# Patient Record
Sex: FEMALE | Race: WHITE | URBAN - METROPOLITAN AREA
[De-identification: names, ages, dates, MRNs, and addresses within clinical notes are randomized per-mention and may not be internally consistent; named-entity substitution may affect disease eponyms.]

---

## 2021-07-26 ENCOUNTER — APPOINTMENT (OUTPATIENT)
Dept: OTHER | Facility: HOSPITAL | Age: 36
End: 2021-07-26
Attending: EMERGENCY MEDICINE

## 2022-01-05 ENCOUNTER — OFFICE VISIT (OUTPATIENT)
Dept: FAMILY MEDICINE CLINIC | Facility: CLINIC | Age: 37
End: 2022-01-05
Payer: COMMERCIAL

## 2022-01-05 VITALS
OXYGEN SATURATION: 98 % | HEART RATE: 91 BPM | TEMPERATURE: 98 F | SYSTOLIC BLOOD PRESSURE: 108 MMHG | DIASTOLIC BLOOD PRESSURE: 70 MMHG | HEIGHT: 60 IN | WEIGHT: 142 LBS | BODY MASS INDEX: 27.88 KG/M2

## 2022-01-05 DIAGNOSIS — Z11.59 NEED FOR HEPATITIS C SCREENING TEST: ICD-10-CM

## 2022-01-05 DIAGNOSIS — G89.29 CHRONIC BILATERAL THORACIC BACK PAIN: ICD-10-CM

## 2022-01-05 DIAGNOSIS — Z00.00 PREVENTATIVE HEALTH CARE: Primary | ICD-10-CM

## 2022-01-05 DIAGNOSIS — M54.6 CHRONIC BILATERAL THORACIC BACK PAIN: ICD-10-CM

## 2022-01-05 PROCEDURE — 99204 OFFICE O/P NEW MOD 45 MIN: CPT | Performed by: INTERNAL MEDICINE

## 2022-01-05 PROCEDURE — 3078F DIAST BP <80 MM HG: CPT | Performed by: INTERNAL MEDICINE

## 2022-01-05 PROCEDURE — 3008F BODY MASS INDEX DOCD: CPT | Performed by: INTERNAL MEDICINE

## 2022-01-05 PROCEDURE — 3074F SYST BP LT 130 MM HG: CPT | Performed by: INTERNAL MEDICINE

## 2022-01-05 RX ORDER — NAPROXEN 500 MG/1
500 TABLET ORAL 2 TIMES DAILY WITH MEALS
Qty: 60 TABLET | Refills: 0 | Status: SHIPPED | OUTPATIENT
Start: 2022-01-05

## 2022-01-05 RX ORDER — CYCLOBENZAPRINE HCL 10 MG
10 TABLET ORAL 3 TIMES DAILY
Qty: 30 TABLET | Refills: 1 | Status: SHIPPED | OUTPATIENT
Start: 2022-01-05 | End: 2022-01-25

## 2022-01-08 NOTE — PROGRESS NOTES
[facility]  New Patient History and Physical      HPI:   Patient presents with:  Establish Care  Back Pain: upper back  Fatigue      Shade Ami is a 43year old female presenting for:  Establishment of care.   Has  has n pain and sore throat. Eyes: Negative for pain, redness and visual disturbance. Respiratory: Negative for apnea, cough, chest tightness, shortness of breath and wheezing. Cardiovascular: Negative for chest pain, palpitations and leg swelling.    Michele (M54.6,  G89.29) Chronic bilateral thoracic back pain  Plan: Likely muscle strain. Discussed supportive treatment with NSAIDs. Muscle relaxer prescribed.                   Health Maintenance:  Annual Physical Never done  COVID-19 Vaccine(1) Never done

## 2022-02-19 ENCOUNTER — LAB ENCOUNTER (OUTPATIENT)
Dept: LAB | Facility: REFERENCE LAB | Age: 37
End: 2022-02-19
Attending: INTERNAL MEDICINE
Payer: COMMERCIAL

## 2022-02-19 DIAGNOSIS — Z00.00 ROUTINE GENERAL MEDICAL EXAMINATION AT A HEALTH CARE FACILITY: Primary | ICD-10-CM

## 2022-02-19 LAB
ALBUMIN SERPL-MCNC: 3.9 G/DL (ref 3.4–5)
ALBUMIN/GLOB SERPL: 1.1 {RATIO} (ref 1–2)
ALP LIVER SERPL-CCNC: 69 U/L
ALT SERPL-CCNC: 19 U/L
ANION GAP SERPL CALC-SCNC: 4 MMOL/L (ref 0–18)
AST SERPL-CCNC: 12 U/L (ref 15–37)
BILIRUB SERPL-MCNC: 0.3 MG/DL (ref 0.1–2)
BUN BLD-MCNC: 12 MG/DL (ref 7–18)
BUN/CREAT SERPL: 24 (ref 10–20)
CALCIUM BLD-MCNC: 9.5 MG/DL (ref 8.5–10.1)
CHLORIDE SERPL-SCNC: 108 MMOL/L (ref 98–112)
CHOLEST SERPL-MCNC: 204 MG/DL (ref ?–200)
CO2 SERPL-SCNC: 25 MMOL/L (ref 21–32)
CREAT BLD-MCNC: 0.5 MG/DL
DEPRECATED RDW RBC AUTO: 44.1 FL (ref 35.1–46.3)
ERYTHROCYTE [DISTWIDTH] IN BLOOD BY AUTOMATED COUNT: 12.8 % (ref 11–15)
FASTING PATIENT LIPID ANSWER: YES
FASTING STATUS PATIENT QL REPORTED: YES
GLOBULIN PLAS-MCNC: 3.6 G/DL (ref 2.8–4.4)
GLUCOSE BLD-MCNC: 98 MG/DL (ref 70–99)
HCT VFR BLD AUTO: 40.5 %
HCV AB SERPL QL IA: NONREACTIVE
HDLC SERPL-MCNC: 52 MG/DL (ref 40–59)
HGB BLD-MCNC: 13.3 G/DL
LDLC SERPL CALC-MCNC: 126 MG/DL (ref ?–100)
MCH RBC QN AUTO: 30.4 PG (ref 26–34)
MCHC RBC AUTO-ENTMCNC: 32.8 G/DL (ref 31–37)
MCV RBC AUTO: 92.7 FL
NONHDLC SERPL-MCNC: 152 MG/DL (ref ?–130)
OSMOLALITY SERPL CALC.SUM OF ELEC: 284 MOSM/KG (ref 275–295)
PLATELET # BLD AUTO: 247 10(3)UL (ref 150–450)
POTASSIUM SERPL-SCNC: 4.2 MMOL/L (ref 3.5–5.1)
PROT SERPL-MCNC: 7.5 G/DL (ref 6.4–8.2)
SODIUM SERPL-SCNC: 137 MMOL/L (ref 136–145)
TRIGL SERPL-MCNC: 147 MG/DL (ref 30–149)
VLDLC SERPL CALC-MCNC: 26 MG/DL (ref 0–30)
WBC # BLD AUTO: 6.9 X10(3) UL (ref 4–11)

## 2022-02-19 PROCEDURE — 86803 HEPATITIS C AB TEST: CPT | Performed by: INTERNAL MEDICINE

## 2022-02-19 PROCEDURE — 80061 LIPID PANEL: CPT

## 2022-02-19 PROCEDURE — 85027 COMPLETE CBC AUTOMATED: CPT | Performed by: INTERNAL MEDICINE

## 2022-02-19 PROCEDURE — 36415 COLL VENOUS BLD VENIPUNCTURE: CPT | Performed by: INTERNAL MEDICINE

## 2022-02-19 PROCEDURE — 80053 COMPREHEN METABOLIC PANEL: CPT | Performed by: INTERNAL MEDICINE

## 2022-03-01 ENCOUNTER — HOSPITAL ENCOUNTER (OUTPATIENT)
Age: 37
Discharge: HOME | End: 2022-03-01
Payer: COMMERCIAL

## 2022-03-01 VITALS
SYSTOLIC BLOOD PRESSURE: 123 MMHG | WEIGHT: 146 LBS | OXYGEN SATURATION: 100 % | TEMPERATURE: 98 F | HEIGHT: 61 IN | RESPIRATION RATE: 16 BRPM | DIASTOLIC BLOOD PRESSURE: 78 MMHG | BODY MASS INDEX: 27.56 KG/M2 | HEART RATE: 76 BPM

## 2022-03-01 DIAGNOSIS — R30.0 DYSURIA: Primary | ICD-10-CM

## 2022-03-01 LAB
B-HCG UR QL: NEGATIVE
BILIRUB UR QL STRIP: NEGATIVE
CLARITY UR: CLEAR
COLOR UR: YELLOW
GLUCOSE UR STRIP-MCNC: NEGATIVE MG/DL
HGB UR QL STRIP: NEGATIVE
LEUKOCYTE ESTERASE UR QL STRIP: NEGATIVE
NITRITE UR QL STRIP: NEGATIVE
PH UR STRIP: 5.5 [PH]
PROT UR STRIP-MCNC: NEGATIVE MG/DL
SP GR UR STRIP: 1.01
UROBILINOGEN UR STRIP-ACNC: <2 MG/DL

## 2022-03-01 PROCEDURE — 87077 CULTURE AEROBIC IDENTIFY: CPT | Performed by: NURSE PRACTITIONER

## 2022-03-01 PROCEDURE — 87186 SC STD MICRODIL/AGAR DIL: CPT | Performed by: NURSE PRACTITIONER

## 2022-03-01 PROCEDURE — 99204 OFFICE O/P NEW MOD 45 MIN: CPT | Performed by: NURSE PRACTITIONER

## 2022-03-01 PROCEDURE — 81002 URINALYSIS NONAUTO W/O SCOPE: CPT | Performed by: NURSE PRACTITIONER

## 2022-03-01 PROCEDURE — 87086 URINE CULTURE/COLONY COUNT: CPT | Performed by: NURSE PRACTITIONER

## 2022-03-01 PROCEDURE — 81025 URINE PREGNANCY TEST: CPT | Performed by: NURSE PRACTITIONER

## 2022-03-01 RX ORDER — CEPHALEXIN 500 MG/1
500 CAPSULE ORAL 2 TIMES DAILY
Qty: 14 CAPSULE | Refills: 0 | Status: SHIPPED | OUTPATIENT
Start: 2022-03-01 | End: 2022-03-08

## 2022-03-01 NOTE — ED INITIAL ASSESSMENT (HPI)
Pt here w c/o pelvic pain and dysuria, frequency x Friday. No back pain, no N/V. No fever. States bought some OTC med to help w symptoms but did not work.

## 2022-03-08 ENCOUNTER — OFFICE VISIT (OUTPATIENT)
Dept: FAMILY MEDICINE CLINIC | Facility: CLINIC | Age: 37
End: 2022-03-08
Payer: COMMERCIAL

## 2022-03-08 VITALS
DIASTOLIC BLOOD PRESSURE: 80 MMHG | WEIGHT: 145.81 LBS | HEART RATE: 88 BPM | SYSTOLIC BLOOD PRESSURE: 114 MMHG | HEIGHT: 61 IN | BODY MASS INDEX: 27.53 KG/M2 | OXYGEN SATURATION: 100 %

## 2022-03-08 DIAGNOSIS — E78.5 HYPERLIPIDEMIA, UNSPECIFIED HYPERLIPIDEMIA TYPE: Primary | ICD-10-CM

## 2022-03-08 DIAGNOSIS — H66.90 ACUTE OTITIS MEDIA, UNSPECIFIED OTITIS MEDIA TYPE: ICD-10-CM

## 2022-03-08 PROCEDURE — 3079F DIAST BP 80-89 MM HG: CPT | Performed by: INTERNAL MEDICINE

## 2022-03-08 PROCEDURE — 99214 OFFICE O/P EST MOD 30 MIN: CPT | Performed by: INTERNAL MEDICINE

## 2022-03-08 PROCEDURE — 3008F BODY MASS INDEX DOCD: CPT | Performed by: INTERNAL MEDICINE

## 2022-03-08 PROCEDURE — 3074F SYST BP LT 130 MM HG: CPT | Performed by: INTERNAL MEDICINE

## 2022-03-08 RX ORDER — AMOXICILLIN AND CLAVULANATE POTASSIUM 875; 125 MG/1; MG/1
1 TABLET, FILM COATED ORAL 2 TIMES DAILY
Qty: 20 TABLET | Refills: 0 | Status: SHIPPED | OUTPATIENT
Start: 2022-03-08 | End: 2022-03-18

## 2022-05-16 ENCOUNTER — OFFICE VISIT (OUTPATIENT)
Dept: INTERNAL MEDICINE CLINIC | Facility: CLINIC | Age: 37
End: 2022-05-16
Payer: COMMERCIAL

## 2022-05-16 VITALS
DIASTOLIC BLOOD PRESSURE: 60 MMHG | SYSTOLIC BLOOD PRESSURE: 96 MMHG | BODY MASS INDEX: 27.75 KG/M2 | HEIGHT: 61 IN | TEMPERATURE: 98 F | WEIGHT: 147 LBS | HEART RATE: 78 BPM | OXYGEN SATURATION: 99 %

## 2022-05-16 DIAGNOSIS — N89.8 VAGINAL DISCHARGE: ICD-10-CM

## 2022-05-16 DIAGNOSIS — Z11.3 SCREENING FOR STD (SEXUALLY TRANSMITTED DISEASE): ICD-10-CM

## 2022-05-16 DIAGNOSIS — Z12.4 PAP SMEAR FOR CERVICAL CANCER SCREENING: Primary | ICD-10-CM

## 2022-05-16 RX ORDER — FAMOTIDINE 20 MG/1
20 TABLET, FILM COATED ORAL 2 TIMES DAILY
Qty: 30 TABLET | Refills: 0 | Status: SHIPPED | OUTPATIENT
Start: 2022-05-16

## 2022-05-16 RX ORDER — FLUCONAZOLE 150 MG/1
150 TABLET ORAL ONCE
Qty: 1 TABLET | Refills: 0 | Status: SHIPPED | OUTPATIENT
Start: 2022-05-16 | End: 2022-05-16

## 2022-05-17 ENCOUNTER — TELEPHONE (OUTPATIENT)
Dept: INTERNAL MEDICINE CLINIC | Facility: CLINIC | Age: 37
End: 2022-05-17

## 2022-05-18 LAB
CHLAMYDIA TRACHOMATIS$RNA, TMA: NOT DETECTED
HPV MRNA E6/E7: NOT DETECTED
NEISSERIA GONORRHOEAE$RNA, TMA: NOT DETECTED

## 2022-05-24 ENCOUNTER — TELEPHONE (OUTPATIENT)
Dept: INTERNAL MEDICINE CLINIC | Facility: CLINIC | Age: 37
End: 2022-05-24

## 2022-05-24 NOTE — TELEPHONE ENCOUNTER
Patient called back and results were discussed, she is doing much better and symptoms cleared up already. She is aware she should get pap done in 5 yrs again.

## 2022-05-24 NOTE — TELEPHONE ENCOUNTER
LMTCB      ----- Message from Coy Sanchez MD sent at 5/19/2022  3:18 PM CDT -----  Please inform her that her PAP smear is normal with negative HPV. Repeat recommended in 3 years. Please inquire with [facility] as to what occurred with vaginosis screen. They ordered a aerobic culture instead, I never placed that order.

## 2022-09-08 ENCOUNTER — OFFICE VISIT (OUTPATIENT)
Dept: INTERNAL MEDICINE CLINIC | Facility: CLINIC | Age: 37
End: 2022-09-08

## 2022-09-08 VITALS
BODY MASS INDEX: 28.89 KG/M2 | HEART RATE: 80 BPM | OXYGEN SATURATION: 100 % | SYSTOLIC BLOOD PRESSURE: 116 MMHG | HEIGHT: 61 IN | TEMPERATURE: 99 F | WEIGHT: 153 LBS | DIASTOLIC BLOOD PRESSURE: 70 MMHG

## 2022-09-08 DIAGNOSIS — R53.83 OTHER FATIGUE: ICD-10-CM

## 2022-09-08 DIAGNOSIS — E78.5 HYPERLIPIDEMIA, UNSPECIFIED HYPERLIPIDEMIA TYPE: ICD-10-CM

## 2022-09-08 DIAGNOSIS — Z13.1 DIABETES MELLITUS SCREENING: ICD-10-CM

## 2022-09-08 DIAGNOSIS — Z00.00 ANNUAL PHYSICAL EXAM: Primary | ICD-10-CM

## 2022-09-08 PROCEDURE — 99395 PREV VISIT EST AGE 18-39: CPT | Performed by: INTERNAL MEDICINE

## 2022-09-08 PROCEDURE — 3074F SYST BP LT 130 MM HG: CPT | Performed by: INTERNAL MEDICINE

## 2022-09-08 PROCEDURE — 3008F BODY MASS INDEX DOCD: CPT | Performed by: INTERNAL MEDICINE

## 2022-09-08 PROCEDURE — 3078F DIAST BP <80 MM HG: CPT | Performed by: INTERNAL MEDICINE

## 2022-09-08 RX ORDER — CYCLOBENZAPRINE HCL 10 MG
10 TABLET ORAL NIGHTLY PRN
Qty: 30 TABLET | Refills: 1 | Status: SHIPPED | OUTPATIENT
Start: 2022-09-08 | End: 2022-09-28

## 2022-09-08 RX ORDER — NAPROXEN 500 MG/1
500 TABLET ORAL 2 TIMES DAILY WITH MEALS
Qty: 60 TABLET | Refills: 0 | Status: SHIPPED | OUTPATIENT
Start: 2022-09-08

## 2022-09-09 LAB
ABSOLUTE BASOPHILS: 31 CELLS/UL (ref 0–200)
ABSOLUTE EOSINOPHILS: 146 CELLS/UL (ref 15–500)
ABSOLUTE LYMPHOCYTES: 3480 CELLS/UL (ref 850–3900)
ABSOLUTE MONOCYTES: 524 CELLS/UL (ref 200–950)
ABSOLUTE NEUTROPHILS: 3519 CELLS/UL (ref 1500–7800)
ALBUMIN/GLOBULIN RATIO: 1.5 (CALC) (ref 1–2.5)
ALBUMIN: 4.5 G/DL (ref 3.6–5.1)
ALKALINE PHOSPHATASE: 61 U/L (ref 31–125)
ALT: 18 U/L (ref 6–29)
AST: 21 U/L (ref 10–30)
BASOPHILS: 0.4 %
BILIRUBIN, TOTAL: 0.3 MG/DL (ref 0.2–1.2)
BUN: 12 MG/DL (ref 7–25)
CALCIUM: 10.1 MG/DL (ref 8.6–10.2)
CARBON DIOXIDE: 24 MMOL/L (ref 20–32)
CHLORIDE: 103 MMOL/L (ref 98–110)
CHOL/HDLC RATIO: 3.6 (CALC)
CHOLESTEROL, TOTAL: 222 MG/DL
CREATININE: 0.57 MG/DL (ref 0.5–0.97)
EGFR: 119 ML/MIN/1.73M2
EOSINOPHILS: 1.9 %
GLOBULIN: 3.1 G/DL (CALC) (ref 1.9–3.7)
GLUCOSE: 90 MG/DL (ref 65–99)
HDL CHOLESTEROL: 61 MG/DL
HEMATOCRIT: 35.7 % (ref 35–45)
HEMOGLOBIN A1C: 5.4 % OF TOTAL HGB
HEMOGLOBIN: 11.9 G/DL (ref 11.7–15.5)
LDL-CHOLESTEROL: 133 MG/DL (CALC)
LYMPHOCYTES: 45.2 %
MCH: 30.2 PG (ref 27–33)
MCHC: 33.3 G/DL (ref 32–36)
MCV: 90.6 FL (ref 80–100)
MONOCYTES: 6.8 %
MPV: 11.1 FL (ref 7.5–12.5)
NEUTROPHILS: 45.7 %
NON-HDL CHOLESTEROL: 161 MG/DL (CALC)
PLATELET COUNT: 218 THOUSAND/UL (ref 140–400)
POTASSIUM: 4.1 MMOL/L (ref 3.5–5.3)
PROTEIN, TOTAL: 7.6 G/DL (ref 6.1–8.1)
RDW: 13.5 % (ref 11–15)
RED BLOOD CELL COUNT: 3.94 MILLION/UL (ref 3.8–5.1)
SODIUM: 137 MMOL/L (ref 135–146)
TRIGLYCERIDES: 150 MG/DL
TSH W/REFLEX TO FT4: 1.56 MIU/L
WHITE BLOOD CELL COUNT: 7.7 THOUSAND/UL (ref 3.8–10.8)

## 2022-09-20 ENCOUNTER — TELEPHONE (OUTPATIENT)
Dept: INTERNAL MEDICINE CLINIC | Facility: CLINIC | Age: 37
End: 2022-09-20

## 2022-09-20 NOTE — TELEPHONE ENCOUNTER
Results were discussed with patient and was advised to follow Md's recommendations on how to lower her cholesterol she will try to follow his advise.

## 2022-09-20 NOTE — TELEPHONE ENCOUNTER
Results reviewed. Please inform patient that lipid panel was abnormal, primarily triglycerides and LDL. LDL (low-density lipoprotein) sometimes called \"bad\" cholesterol makes up the majority of your body's cholesterol. High levels of LDL raise your risk of cardiovascular disease including heart disease and stroke. Diets high in saturated fats, salts and cholesterol such as fatty meats, processed foods, dairy and cured meats can lead to elevated LDL levels. Triglycerides are a type of fat (lipid) found in your blood. Recommendations to lower triglycerides include avoiding sugars, refined carbohydrates and alcohol. Increasing foods higher in omega-3 such as fish, dark leafy green vegetables. Increase consumption of vegetables, fruits, herbs, nuts, beans and whole grains. Decrease intake of saturated fats, processed foods, meats and sweets. Moderate amounts of dairy, poultry and seafood. No other abnormalities.

## 2022-12-07 ENCOUNTER — OFFICE VISIT (OUTPATIENT)
Dept: INTERNAL MEDICINE CLINIC | Facility: CLINIC | Age: 37
End: 2022-12-07
Payer: COMMERCIAL

## 2022-12-07 VITALS
OXYGEN SATURATION: 100 % | BODY MASS INDEX: 27 KG/M2 | WEIGHT: 145 LBS | TEMPERATURE: 98 F | DIASTOLIC BLOOD PRESSURE: 60 MMHG | HEART RATE: 72 BPM | SYSTOLIC BLOOD PRESSURE: 116 MMHG

## 2022-12-07 DIAGNOSIS — Z32.00 POSSIBLE PREGNANCY, NOT CONFIRMED: ICD-10-CM

## 2022-12-07 DIAGNOSIS — Z30.09 ENCOUNTER FOR COUNSELING REGARDING CONTRACEPTION: ICD-10-CM

## 2022-12-07 DIAGNOSIS — E78.5 HYPERLIPIDEMIA, UNSPECIFIED HYPERLIPIDEMIA TYPE: ICD-10-CM

## 2022-12-07 DIAGNOSIS — R11.2 NAUSEA AND VOMITING, UNSPECIFIED VOMITING TYPE: Primary | ICD-10-CM

## 2022-12-07 LAB
CONTROL LINE PRESENT WITH A CLEAR BACKGROUND (YES/NO): YES YES/NO
PREGNANCY TEST, URINE: NEGATIVE

## 2022-12-07 PROCEDURE — 3074F SYST BP LT 130 MM HG: CPT | Performed by: INTERNAL MEDICINE

## 2022-12-07 PROCEDURE — 99214 OFFICE O/P EST MOD 30 MIN: CPT | Performed by: INTERNAL MEDICINE

## 2022-12-07 PROCEDURE — 90686 IIV4 VACC NO PRSV 0.5 ML IM: CPT | Performed by: INTERNAL MEDICINE

## 2022-12-07 PROCEDURE — 3078F DIAST BP <80 MM HG: CPT | Performed by: INTERNAL MEDICINE

## 2022-12-07 PROCEDURE — 90471 IMMUNIZATION ADMIN: CPT | Performed by: INTERNAL MEDICINE

## 2022-12-07 PROCEDURE — 81025 URINE PREGNANCY TEST: CPT | Performed by: INTERNAL MEDICINE

## 2022-12-07 RX ORDER — FAMOTIDINE 20 MG/1
20 TABLET, FILM COATED ORAL DAILY
Qty: 30 TABLET | Refills: 0 | Status: SHIPPED | OUTPATIENT
Start: 2022-12-07

## 2022-12-07 RX ORDER — ONDANSETRON 4 MG/1
4 TABLET, FILM COATED ORAL DAILY PRN
Qty: 10 TABLET | Refills: 0 | Status: SHIPPED | OUTPATIENT
Start: 2022-12-07

## 2023-03-08 ENCOUNTER — OFFICE VISIT (OUTPATIENT)
Dept: INTERNAL MEDICINE CLINIC | Facility: CLINIC | Age: 38
End: 2023-03-08
Payer: COMMERCIAL

## 2023-03-08 VITALS
OXYGEN SATURATION: 99 % | TEMPERATURE: 99 F | HEART RATE: 79 BPM | WEIGHT: 152 LBS | DIASTOLIC BLOOD PRESSURE: 64 MMHG | BODY MASS INDEX: 29 KG/M2 | SYSTOLIC BLOOD PRESSURE: 96 MMHG

## 2023-03-08 DIAGNOSIS — E78.5 HYPERLIPIDEMIA, UNSPECIFIED HYPERLIPIDEMIA TYPE: ICD-10-CM

## 2023-03-08 DIAGNOSIS — M54.6 ACUTE BILATERAL THORACIC BACK PAIN: Primary | ICD-10-CM

## 2023-03-08 RX ORDER — NAPROXEN 500 MG/1
500 TABLET ORAL 2 TIMES DAILY PRN
Qty: 60 TABLET | Refills: 1 | Status: SHIPPED | OUTPATIENT
Start: 2023-03-08

## 2023-03-08 RX ORDER — CYCLOBENZAPRINE HCL 10 MG
10 TABLET ORAL NIGHTLY PRN
Qty: 90 TABLET | Refills: 1 | Status: SHIPPED | OUTPATIENT
Start: 2023-03-08

## 2023-03-08 RX ORDER — CYCLOBENZAPRINE HCL 10 MG
10 TABLET ORAL NIGHTLY PRN
Qty: 30 TABLET | Refills: 1 | Status: SHIPPED | OUTPATIENT
Start: 2023-03-08 | End: 2023-03-08

## 2023-08-19 LAB
CHOL/HDLC RATIO: 4.3 (CALC)
CHOLESTEROL, TOTAL: 213 MG/DL
HDL CHOLESTEROL: 50 MG/DL
LDL-CHOLESTEROL: 130 MG/DL (CALC)
NON-HDL CHOLESTEROL: 163 MG/DL (CALC)
TRIGLYCERIDES: 193 MG/DL

## 2023-09-05 ENCOUNTER — VIRTUAL PHONE E/M (OUTPATIENT)
Dept: INTERNAL MEDICINE CLINIC | Facility: CLINIC | Age: 38
End: 2023-09-05
Payer: COMMERCIAL

## 2023-09-05 DIAGNOSIS — U07.1 LAB TEST POSITIVE FOR DETECTION OF COVID-19 VIRUS: Primary | ICD-10-CM

## 2023-09-05 DIAGNOSIS — E78.5 HYPERLIPIDEMIA, UNSPECIFIED HYPERLIPIDEMIA TYPE: ICD-10-CM

## 2023-09-05 PROCEDURE — 99213 OFFICE O/P EST LOW 20 MIN: CPT | Performed by: INTERNAL MEDICINE

## 2023-09-05 RX ORDER — NAPROXEN 500 MG/1
500 TABLET ORAL 2 TIMES DAILY PRN
Qty: 60 TABLET | Refills: 1 | Status: SHIPPED | OUTPATIENT
Start: 2023-09-05

## 2023-09-29 ENCOUNTER — TELEPHONE (OUTPATIENT)
Dept: INTERNAL MEDICINE CLINIC | Facility: CLINIC | Age: 38
End: 2023-09-29

## 2023-09-29 DIAGNOSIS — Z13.1 DIABETES MELLITUS SCREENING: ICD-10-CM

## 2023-09-29 DIAGNOSIS — Z00.00 PREVENTATIVE HEALTH CARE: ICD-10-CM

## 2023-09-29 DIAGNOSIS — R53.83 OTHER FATIGUE: ICD-10-CM

## 2023-09-29 DIAGNOSIS — E78.5 HYPERLIPIDEMIA, UNSPECIFIED HYPERLIPIDEMIA TYPE: Primary | ICD-10-CM

## 2023-09-29 NOTE — TELEPHONE ENCOUNTER
Pt is calling stating that she has on appointment on 10/11 and was told needs to get blood work done before appointment. Pt mention she goes to [email or web address] in [place] and would like for orders to be mail to home address. Confirm address with pt.        Please call and advise

## 2023-10-23 ENCOUNTER — NURSE TRIAGE (OUTPATIENT)
Dept: INTERNAL MEDICINE CLINIC | Facility: CLINIC | Age: 38
End: 2023-10-23

## 2023-10-23 NOTE — TELEPHONE ENCOUNTER
Pt is calling stating that has sore throat, headache and both earache for about 3 days. Pt wants to know if doctor could prescribe something or needs to be seen.        Please call and advise

## 2023-10-23 NOTE — TELEPHONE ENCOUNTER
Had Covid 1 month ago. Caller will go to [facility] near her home. Reason for Disposition   SEVERE sore throat pain    Answer Assessment - Initial Assessment Questions  1. ONSET: \"When did the throat start hurting? \" (Hours or days ago)       3 days  2. SEVERITY: \"How bad is the sore throat? \" (Scale 1-10; mild, moderate or severe)    - MILD (1-3):  doesn't interfere with eating or normal activities    - MODERATE (4-7): interferes with eating some solids and normal activities    - SEVERE (8-10):  excruciating pain, interferes with most normal activities    - SEVERE DYSPHAGIA: can't swallow liquids, drooling      8/10  3. STREP EXPOSURE: \"Has there been any exposure to strep within the past week? \" If Yes, ask: \"What type of contact occurred? \"       No  4. VIRAL SYMPTOMS: Omer Lee there any symptoms of a cold, such as a runny nose, cough, hoarse voice or red eyes? \"       Ear ache  5. FEVER: \"Do you have a fever? \" If Yes, ask: \"What is your temperature, how was it measured, and when did it start? \"      No  6. PUS ON THE TONSILS: \"Is there pus on the tonsils in the back of your throat? \"      No  7. OTHER SYMPTOMS: \"Do you have any other symptoms? \" (e.g., difficulty breathing, headache, rash)      Earache  8. PREGNANCY: \"Is there any chance you are pregnant? \" \"When was your last menstrual period? \"      9/26/23    Protocols used: Sore Throat-A-OH

## 2023-10-31 ENCOUNTER — OFFICE VISIT (OUTPATIENT)
Dept: FAMILY MEDICINE CLINIC | Facility: CLINIC | Age: 38
End: 2023-10-31

## 2023-10-31 ENCOUNTER — NURSE TRIAGE (OUTPATIENT)
Dept: INTERNAL MEDICINE CLINIC | Facility: CLINIC | Age: 38
End: 2023-10-31

## 2023-10-31 VITALS
SYSTOLIC BLOOD PRESSURE: 136 MMHG | DIASTOLIC BLOOD PRESSURE: 86 MMHG | OXYGEN SATURATION: 99 % | TEMPERATURE: 98 F | WEIGHT: 156.38 LBS | HEIGHT: 61 IN | HEART RATE: 70 BPM | RESPIRATION RATE: 18 BRPM | BODY MASS INDEX: 29.52 KG/M2

## 2023-10-31 DIAGNOSIS — J02.9 ACUTE PHARYNGITIS, UNSPECIFIED ETIOLOGY: Primary | ICD-10-CM

## 2023-10-31 PROCEDURE — 3079F DIAST BP 80-89 MM HG: CPT | Performed by: PHYSICIAN ASSISTANT

## 2023-10-31 PROCEDURE — 3008F BODY MASS INDEX DOCD: CPT | Performed by: PHYSICIAN ASSISTANT

## 2023-10-31 PROCEDURE — 3075F SYST BP GE 130 - 139MM HG: CPT | Performed by: PHYSICIAN ASSISTANT

## 2023-10-31 PROCEDURE — 99213 OFFICE O/P EST LOW 20 MIN: CPT | Performed by: PHYSICIAN ASSISTANT

## 2023-10-31 RX ORDER — AMOXICILLIN 500 MG/1
500 CAPSULE ORAL 3 TIMES DAILY
Qty: 30 CAPSULE | Refills: 0 | Status: SHIPPED | OUTPATIENT
Start: 2023-10-31 | End: 2023-11-10

## 2023-10-31 NOTE — TELEPHONE ENCOUNTER
Pt was seen at the [facility]. She was has given Amox-clav for 5 days dexamethasone x 2 days. She has been treating herself with Tylenol. Patient will go to [facility]. Reason for Disposition   Patient sounds very sick or weak to the triager    Answer Assessment - Initial Assessment Questions  1. COVID-19 DIAGNOSIS: \"Who made your COVID-19 diagnosis? \" \"Was it confirmed by a positive lab test or self-test?\" If not diagnosed by a doctor (or NP/PA), ask \"Are there lots of cases (community spread) where you live? \" Note: See public health department website, if unsure. No test   2. COVID-19 EXPOSURE: \"Was there any known exposure to COVID before the symptoms began? \" CDC Definition of close contact: within 6 feet (2 meters) for a total of 15 minutes or more over a 24-hour period. 3. ONSET: \"When did the COVID-19 symptoms start? \"       10/20/23  4. WORST SYMPTOM: \"What is your worst symptom? \" (e.g., cough, fever, shortness of breath, muscle aches)      Headache/ sore throat  5. COUGH: \"Do you have a cough? \" If Yes, ask: \"How bad is the cough? \"       No  6. FEVER: \"Do you have a fever? \" If Yes, ask: \"What is your temperature, how was it measured, and when did it start? \"      No   7. RESPIRATORY STATUS: \"Describe your breathing? \" (e.g., shortness of breath, wheezing, unable to speak)       No   8. BETTER-SAME-WORSE: Terrial Crank you getting better, staying the same or getting worse compared to yesterday? \"  If getting worse, ask, \"In what way? \"      Same  9. HIGH RISK DISEASE: \"Do you have any chronic medical problems? \" (e.g., asthma, heart or lung disease, weak immune system, obesity, etc.)      No   10. VACCINE: \"Have you had the COVID-19 vaccine? \" If Yes, ask: \"Which one, how many shots, when did you get it? \"        3 vaccines   11. BOOSTER: \"Have you received your COVID-19 booster? \" If Yes, ask: \"Which one and when did you get it? \"          12. PREGNANCY: \"Is there any chance you are pregnant? \" \"When was your last menstrual period? \"        10/25/23  13. OTHER SYMPTOMS: \"Do you have any other symptoms? \"  (e.g., chills, fatigue, headache, loss of smell or taste, muscle pain, sore throat)        Headache/ hot eyes/ sore throat/ear pain/ tired/ feels hot inside her body / sole of feet are painful. 14. O2 SATURATION MONITOR:  \"Do you use an oxygen saturation monitor (pulse oximeter) at home? \" If Yes, ask \"What is your reading (oxygen level) today? \" \"What is your usual oxygen saturation reading? \" (e.g., 95%)    Protocols used: Coronavirus ([id number]-60) Diagnosed or [id number]

## 2023-10-31 NOTE — TELEPHONE ENCOUNTER
Pt is calling stating that she had call last week, encounter from 10/23. Pt states she went to  and was tested positive for strep throat. Pt mention she was given medication and is almost done with medication but is still not getting better. Pt wants to know if there is something else she can do.       Please call and advise

## 2023-11-07 ENCOUNTER — OFFICE VISIT (OUTPATIENT)
Dept: INTERNAL MEDICINE CLINIC | Facility: CLINIC | Age: 38
End: 2023-11-07
Payer: COMMERCIAL

## 2023-11-07 VITALS
HEART RATE: 79 BPM | OXYGEN SATURATION: 100 % | HEIGHT: 60.25 IN | BODY MASS INDEX: 30.04 KG/M2 | WEIGHT: 155 LBS | DIASTOLIC BLOOD PRESSURE: 60 MMHG | TEMPERATURE: 98 F | SYSTOLIC BLOOD PRESSURE: 112 MMHG

## 2023-11-07 DIAGNOSIS — Z00.00 ANNUAL PHYSICAL EXAM: Primary | ICD-10-CM

## 2023-11-07 DIAGNOSIS — E78.5 HYPERLIPIDEMIA, UNSPECIFIED HYPERLIPIDEMIA TYPE: ICD-10-CM

## 2023-11-07 DIAGNOSIS — Z13.1 DIABETES MELLITUS SCREENING: ICD-10-CM

## 2023-11-07 LAB
ABSOLUTE BASOPHILS: 27 CELLS/UL (ref 0–200)
ABSOLUTE EOSINOPHILS: 168 CELLS/UL (ref 15–500)
ABSOLUTE LYMPHOCYTES: 2439 CELLS/UL (ref 850–3900)
ABSOLUTE MONOCYTES: 482 CELLS/UL (ref 200–950)
ABSOLUTE NEUTROPHILS: 3585 CELLS/UL (ref 1500–7800)
ALBUMIN/GLOBULIN RATIO: 1.4 (CALC) (ref 1–2.5)
ALBUMIN: 4.2 G/DL (ref 3.6–5.1)
ALKALINE PHOSPHATASE: 65 U/L (ref 31–125)
ALT: 14 U/L (ref 6–29)
AST: 16 U/L (ref 10–30)
BASOPHILS: 0.4 %
BILIRUBIN, TOTAL: 0.3 MG/DL (ref 0.2–1.2)
BUN: 16 MG/DL (ref 7–25)
CALCIUM: 9.3 MG/DL (ref 8.6–10.2)
CARBON DIOXIDE: 25 MMOL/L (ref 20–32)
CHLORIDE: 107 MMOL/L (ref 98–110)
CHOL/HDLC RATIO: 3.9 (CALC)
CHOLESTEROL, TOTAL: 232 MG/DL
CREATININE: 0.51 MG/DL (ref 0.5–0.97)
EGFR: 122 ML/MIN/1.73M2
EOSINOPHILS: 2.5 %
GLOBULIN: 3.1 G/DL (CALC) (ref 1.9–3.7)
GLUCOSE: 109 MG/DL (ref 65–99)
HDL CHOLESTEROL: 59 MG/DL
HEMATOCRIT: 40.2 % (ref 35–45)
HEMOGLOBIN A1C: 5.6 % OF TOTAL HGB
HEMOGLOBIN: 13.1 G/DL (ref 11.7–15.5)
LDL-CHOLESTEROL: 151 MG/DL (CALC)
LYMPHOCYTES: 36.4 %
MCH: 30 PG (ref 27–33)
MCHC: 32.6 G/DL (ref 32–36)
MCV: 92.2 FL (ref 80–100)
MONOCYTES: 7.2 %
MPV: 11.3 FL (ref 7.5–12.5)
NEUTROPHILS: 53.5 %
NON-HDL CHOLESTEROL: 173 MG/DL (CALC)
PLATELET COUNT: 243 THOUSAND/UL (ref 140–400)
POTASSIUM: 4.4 MMOL/L (ref 3.5–5.3)
PROTEIN, TOTAL: 7.3 G/DL (ref 6.1–8.1)
RDW: 12.5 % (ref 11–15)
RED BLOOD CELL COUNT: 4.36 MILLION/UL (ref 3.8–5.1)
SODIUM: 138 MMOL/L (ref 135–146)
TRIGLYCERIDES: 107 MG/DL
TSH W/REFLEX TO FT4: 1.15 MIU/L
WHITE BLOOD CELL COUNT: 6.7 THOUSAND/UL (ref 3.8–10.8)

## 2023-11-07 PROCEDURE — 90471 IMMUNIZATION ADMIN: CPT | Performed by: INTERNAL MEDICINE

## 2023-11-07 PROCEDURE — 99395 PREV VISIT EST AGE 18-39: CPT | Performed by: INTERNAL MEDICINE

## 2023-11-07 PROCEDURE — 3078F DIAST BP <80 MM HG: CPT | Performed by: INTERNAL MEDICINE

## 2023-11-07 PROCEDURE — 90686 IIV4 VACC NO PRSV 0.5 ML IM: CPT | Performed by: INTERNAL MEDICINE

## 2023-11-07 PROCEDURE — 3008F BODY MASS INDEX DOCD: CPT | Performed by: INTERNAL MEDICINE

## 2023-11-07 PROCEDURE — 3074F SYST BP LT 130 MM HG: CPT | Performed by: INTERNAL MEDICINE

## 2023-12-22 ENCOUNTER — HOSPITAL ENCOUNTER (EMERGENCY)
Facility: HOSPITAL | Age: 38
Discharge: HOME | End: 2023-12-22
Attending: STUDENT IN AN ORGANIZED HEALTH CARE EDUCATION/TRAINING PROGRAM
Payer: COMMERCIAL

## 2023-12-22 ENCOUNTER — OFFICE VISIT (OUTPATIENT)
Dept: FAMILY MEDICINE CLINIC | Facility: CLINIC | Age: 38
End: 2023-12-22
Payer: COMMERCIAL

## 2023-12-22 ENCOUNTER — APPOINTMENT (OUTPATIENT)
Dept: GENERAL RADIOLOGY | Facility: HOSPITAL | Age: 38
End: 2023-12-22
Payer: COMMERCIAL

## 2023-12-22 ENCOUNTER — TELEPHONE (OUTPATIENT)
Dept: INTERNAL MEDICINE CLINIC | Facility: CLINIC | Age: 38
End: 2023-12-22

## 2023-12-22 VITALS
OXYGEN SATURATION: 99 % | DIASTOLIC BLOOD PRESSURE: 72 MMHG | BODY MASS INDEX: 28.52 KG/M2 | RESPIRATION RATE: 16 BRPM | TEMPERATURE: 98 F | HEIGHT: 62 IN | SYSTOLIC BLOOD PRESSURE: 118 MMHG | WEIGHT: 155 LBS | HEART RATE: 73 BPM

## 2023-12-22 VITALS
WEIGHT: 155 LBS | BODY MASS INDEX: 27.46 KG/M2 | DIASTOLIC BLOOD PRESSURE: 76 MMHG | SYSTOLIC BLOOD PRESSURE: 101 MMHG | HEART RATE: 72 BPM | RESPIRATION RATE: 18 BRPM | OXYGEN SATURATION: 98 % | HEIGHT: 63 IN | TEMPERATURE: 99 F

## 2023-12-22 DIAGNOSIS — R07.9 CHEST PAIN OF UNCERTAIN ETIOLOGY: Primary | ICD-10-CM

## 2023-12-22 DIAGNOSIS — R07.9 CHEST PAIN, UNSPECIFIED TYPE: Primary | ICD-10-CM

## 2023-12-22 LAB
ALBUMIN SERPL-MCNC: 4.5 G/DL (ref 3.2–4.8)
ALBUMIN/GLOB SERPL: 1.4 {RATIO} (ref 1–2)
ALP LIVER SERPL-CCNC: 64 U/L
ALT SERPL-CCNC: 15 U/L
ANION GAP SERPL CALC-SCNC: 10 MMOL/L (ref 0–18)
AST SERPL-CCNC: 15 U/L (ref ?–34)
BASOPHILS # BLD AUTO: 0.04 X10(3) UL (ref 0–0.2)
BASOPHILS NFR BLD AUTO: 0.5 %
BILIRUB SERPL-MCNC: 0.2 MG/DL (ref 0.3–1.2)
BUN BLD-MCNC: 12 MG/DL (ref 9–23)
BUN/CREAT SERPL: 20 (ref 10–20)
CALCIUM BLD-MCNC: 9.5 MG/DL (ref 8.7–10.4)
CHLORIDE SERPL-SCNC: 104 MMOL/L (ref 98–112)
CO2 SERPL-SCNC: 25 MMOL/L (ref 21–32)
CREAT BLD-MCNC: 0.6 MG/DL
D DIMER PPP FEU-MCNC: <0.27 UG/ML FEU (ref ?–0.5)
DEPRECATED RDW RBC AUTO: 41.5 FL (ref 35.1–46.3)
EGFRCR SERPLBLD CKD-EPI 2021: 116 ML/MIN/1.73M2 (ref 60–?)
EOSINOPHIL # BLD AUTO: 0.23 X10(3) UL (ref 0–0.7)
EOSINOPHIL NFR BLD AUTO: 2.6 %
ERYTHROCYTE [DISTWIDTH] IN BLOOD BY AUTOMATED COUNT: 12.8 % (ref 11–15)
GLOBULIN PLAS-MCNC: 3.3 G/DL (ref 2.8–4.4)
GLUCOSE BLD-MCNC: 99 MG/DL (ref 70–99)
HCT VFR BLD AUTO: 36.7 %
HGB BLD-MCNC: 12.4 G/DL
IMM GRANULOCYTES # BLD AUTO: 0.01 X10(3) UL (ref 0–1)
IMM GRANULOCYTES NFR BLD: 0.1 %
LYMPHOCYTES # BLD AUTO: 3.94 X10(3) UL (ref 1–4)
LYMPHOCYTES NFR BLD AUTO: 44.4 %
MCH RBC QN AUTO: 30.2 PG (ref 26–34)
MCHC RBC AUTO-ENTMCNC: 33.8 G/DL (ref 31–37)
MCV RBC AUTO: 89.3 FL
MONOCYTES # BLD AUTO: 0.53 X10(3) UL (ref 0.1–1)
MONOCYTES NFR BLD AUTO: 6 %
NEUTROPHILS # BLD AUTO: 4.13 X10 (3) UL (ref 1.5–7.7)
NEUTROPHILS # BLD AUTO: 4.13 X10(3) UL (ref 1.5–7.7)
NEUTROPHILS NFR BLD AUTO: 46.4 %
OSMOLALITY SERPL CALC.SUM OF ELEC: 288 MOSM/KG (ref 275–295)
PLATELET # BLD AUTO: 250 10(3)UL (ref 150–450)
POTASSIUM SERPL-SCNC: 3.5 MMOL/L (ref 3.5–5.1)
PROT SERPL-MCNC: 7.8 G/DL (ref 5.7–8.2)
RBC # BLD AUTO: 4.11 X10(6)UL
SODIUM SERPL-SCNC: 139 MMOL/L (ref 136–145)
TROPONIN I SERPL HS-MCNC: <3 NG/L
WBC # BLD AUTO: 8.9 X10(3) UL (ref 4–11)

## 2023-12-22 PROCEDURE — 84484 ASSAY OF TROPONIN QUANT: CPT | Performed by: STUDENT IN AN ORGANIZED HEALTH CARE EDUCATION/TRAINING PROGRAM

## 2023-12-22 PROCEDURE — 80053 COMPREHEN METABOLIC PANEL: CPT | Performed by: STUDENT IN AN ORGANIZED HEALTH CARE EDUCATION/TRAINING PROGRAM

## 2023-12-22 PROCEDURE — 93005 ELECTROCARDIOGRAM TRACING: CPT

## 2023-12-22 PROCEDURE — 85379 FIBRIN DEGRADATION QUANT: CPT | Performed by: STUDENT IN AN ORGANIZED HEALTH CARE EDUCATION/TRAINING PROGRAM

## 2023-12-22 PROCEDURE — 99284 EMERGENCY DEPT VISIT MOD MDM: CPT

## 2023-12-22 PROCEDURE — 93010 ELECTROCARDIOGRAM REPORT: CPT

## 2023-12-22 PROCEDURE — 99285 EMERGENCY DEPT VISIT HI MDM: CPT

## 2023-12-22 PROCEDURE — 71045 X-RAY EXAM CHEST 1 VIEW: CPT | Performed by: STUDENT IN AN ORGANIZED HEALTH CARE EDUCATION/TRAINING PROGRAM

## 2023-12-22 PROCEDURE — 96374 THER/PROPH/DIAG INJ IV PUSH: CPT

## 2023-12-22 PROCEDURE — 85025 COMPLETE CBC W/AUTO DIFF WBC: CPT | Performed by: STUDENT IN AN ORGANIZED HEALTH CARE EDUCATION/TRAINING PROGRAM

## 2023-12-22 RX ORDER — NAPROXEN 500 MG/1
500 TABLET ORAL 2 TIMES DAILY PRN
Qty: 10 TABLET | Refills: 0 | Status: SHIPPED | OUTPATIENT
Start: 2023-12-22 | End: 2023-12-27

## 2023-12-22 RX ORDER — KETOROLAC TROMETHAMINE 15 MG/ML
15 INJECTION, SOLUTION INTRAMUSCULAR; INTRAVENOUS ONCE
Status: COMPLETED | OUTPATIENT
Start: 2023-12-22 | End: 2023-12-22

## 2023-12-22 NOTE — TELEPHONE ENCOUNTER
Patient started feeling dizzy yesterday after eating shrimp and veggie soup. She felt fainty. She would like to know if she can be seen. She's available to talk til 1pm.     She also mentions poking on her chest a little.

## 2023-12-22 NOTE — TELEPHONE ENCOUNTER
Spoke with pt who states that she could not talk now because she was at work. Pt was advised to go to [facility]. Pt states that she will go to [facility] after her work.

## 2023-12-23 LAB
ATRIAL RATE: 71 BPM
P AXIS: 53 DEGREES
P-R INTERVAL: 150 MS
Q-T INTERVAL: 426 MS
QRS DURATION: 84 MS
QTC CALCULATION (BEZET): 462 MS
R AXIS: 12 DEGREES
T AXIS: 31 DEGREES
VENTRICULAR RATE: 71 BPM

## 2024-05-07 ENCOUNTER — OFFICE VISIT (OUTPATIENT)
Dept: INTERNAL MEDICINE CLINIC | Facility: CLINIC | Age: 39
End: 2024-05-07

## 2024-05-07 VITALS
DIASTOLIC BLOOD PRESSURE: 80 MMHG | SYSTOLIC BLOOD PRESSURE: 116 MMHG | HEART RATE: 86 BPM | HEIGHT: 60.25 IN | WEIGHT: 159 LBS | BODY MASS INDEX: 30.81 KG/M2 | TEMPERATURE: 98 F | OXYGEN SATURATION: 100 %

## 2024-05-07 DIAGNOSIS — E78.5 HYPERLIPIDEMIA, UNSPECIFIED HYPERLIPIDEMIA TYPE: ICD-10-CM

## 2024-05-07 DIAGNOSIS — R53.83 OTHER FATIGUE: ICD-10-CM

## 2024-05-07 DIAGNOSIS — R63.5 WEIGHT GAIN: ICD-10-CM

## 2024-05-07 DIAGNOSIS — Z71.3 WEIGHT LOSS COUNSELING, ENCOUNTER FOR: Primary | ICD-10-CM

## 2024-05-07 DIAGNOSIS — Z12.31 SCREENING MAMMOGRAM FOR BREAST CANCER: ICD-10-CM

## 2024-05-07 PROCEDURE — 99214 OFFICE O/P EST MOD 30 MIN: CPT | Performed by: INTERNAL MEDICINE

## 2024-05-07 RX ORDER — NAPROXEN 500 MG/1
500 TABLET ORAL 2 TIMES DAILY PRN
Qty: 60 TABLET | Refills: 1 | Status: SHIPPED | OUTPATIENT
Start: 2024-05-07

## 2024-05-07 NOTE — PROGRESS NOTES
[facility]  Return Patient    HPI:     Chief Complaint   Patient presents with    Follow - Up       Belinda Laura is a 38 year old female presenting for:  Follow up.  Has  has no past medical history on file.     Follow up.      Concern about weight gain.      Labs:   Complete Metabolic Panel:  Lab Results   Component Value Date/Time     12/22/2023 07:50 PM    K 3.5 12/22/2023 07:50 PM     12/22/2023 07:50 PM    CO2 25.0 12/22/2023 07:50 PM    CREATSERUM 0.60 12/22/2023 07:50 PM    CA 9.5 12/22/2023 07:50 PM    GLU 99 12/22/2023 07:50 PM    TP 7.8 12/22/2023 07:50 PM    ALB 4.5 12/22/2023 07:50 PM    ALKPHO 64 12/22/2023 07:50 PM    AST 15 12/22/2023 07:50 PM    ALT 15 12/22/2023 07:50 PM    BILT 0.2 (L) 12/22/2023 07:50 PM        Hemoglobin A1C, Microalbumin  Lab Results   Component Value Date/Time    A1C 5.6 11/06/2023 09:28 AM        Lipid panel  Lab Results   Component Value Date/Time    CHOLEST 232 (H) 11/06/2023 09:28 AM    HDL 59 11/06/2023 09:28 AM    TRIG 107 11/06/2023 09:28 AM     (H) 11/06/2023 09:28 AM    NONHDLC 173 (H) 11/06/2023 09:28 AM     The 10-year ASCVD risk score (Finesse DANIELLE, et al., 2019) is: 0.6%    Values used to calculate the score:      Age: 38 years      Sex: Female      Is Non- : No      Diabetic: No      Tobacco smoker: No      Systolic Blood Pressure: 118 mmHg      Is BP treated: No      HDL Cholesterol: 59 mg/dL      Total Cholesterol: 232 mg/dL       Medications:  Current Outpatient Medications   Medication Sig Dispense Refill    naproxen 500 MG Oral Tab Take 1 tablet (500 mg total) by mouth 2 (two) times daily as needed. 60 tablet 1    Multiple Vitamin (MULTI-VITAMIN) Oral Tab Take 1 tablet by mouth daily.        PMH:  No past medical history on file.      PSH:  No past surgical history on file.    Allergies:  No Known Allergies   Social History:  Social History     Socioeconomic History    Marital status:  Single   Tobacco Use    Smoking status: Never    Smokeless tobacco: Never   Substance and Sexual Activity    Alcohol use: Never    Drug use: Never     Social Determinants of Health     Food Insecurity: No Food Insecurity (3/30/2021)    Received from [facility], [facility]    Food Insecurity     Within the past 30 days, I worried whether my food would run out before I got money to buy more. / En los últimos 30 días, me preocupó que la comida se podía acabar antes de tener dinero para compr...: Never true / Nunca     Within the past 30 days, the food that I bought just didn't last, and I didn't have money to get more. / En los últimos 30 días, La comida que compré no rindió lo suficiente, y no tenía dinero para...: Never true / Nunca   Physical Activity: Inactive (2/28/2020)    Received from [facility], [facility]    Exercise Vital Sign     Days of Exercise per Week: 0 days     Minutes of Exercise per Session: 0 min   Stress: No Stress Concern Present (2/28/2020)    Received from [facility], [facility] [employer] [facility] - Occupational Stress Questionnaire     Feeling of Stress : Only a little        Family History:  Family History   Problem Relation Age of Onset    Other (Other) Father         alcoholisim    No Known Problems Mother     Diabetes Sister     No Known Problems Brother     Cancer Sister         bone          REVIEW OF SYSTEMS:   Review of Systems   Constitutional:  Negative for chills, fatigue, fever and unexpected weight change.   HENT:  Negative for congestion, ear pain, hearing loss, rhinorrhea, sinus pain and sore throat.    Eyes:  Negative for pain, redness and visual disturbance.   Respiratory:  Negative for apnea, cough, chest tightness, shortness of breath and wheezing.    Cardiovascular:  Negative for chest pain, palpitations and leg swelling.    Gastrointestinal:  Negative for abdominal distention, abdominal pain, blood in stool, constipation, nausea and vomiting.   Endocrine: Negative for cold intolerance, heat intolerance and polyuria.   Genitourinary:  Negative for dysuria, hematuria and urgency.   Musculoskeletal:  Negative for arthralgias, back pain, gait problem, joint swelling, myalgias and neck pain.   Skin:  Negative for rash and wound.   Allergic/Immunologic: Negative for food allergies and immunocompromised state.   Neurological:  Negative for dizziness, seizures, facial asymmetry, speech difficulty, weakness, light-headedness, numbness and headaches.   Hematological:  Negative for adenopathy. Does not bruise/bleed easily.   Psychiatric/Behavioral:  Negative for behavioral problems, sleep disturbance and suicidal ideas. The patient is not nervous/anxious.             PHYSICAL EXAM:   Ht 5' 0.25\" (1.53 m)   LMP 12/21/2023 (Exact Date)   BMI 30.02 kg/m²  Estimated body mass index is 30.02 kg/m² as calculated from the following:    Height as of this encounter: 5' 0.25\" (1.53 m).    Weight as of 12/22/23: 155 lb (70.3 kg).     Wt Readings from Last 3 Encounters:   12/22/23 155 lb (70.3 kg)   12/22/23 155 lb (70.3 kg)   11/07/23 155 lb (70.3 kg)       Physical Exam  Vitals reviewed. Exam conducted with a chaperone present.   Constitutional:       General: She is not in acute distress.     Appearance: She is well-developed.   HENT:      Head: Normocephalic and atraumatic.   Eyes:      Conjunctiva/sclera: Conjunctivae normal.      Pupils: Pupils are equal, round, and reactive to light.   Neck:      Thyroid: No thyromegaly.   Cardiovascular:      Rate and Rhythm: Normal rate and regular rhythm.      Heart sounds: Normal heart sounds, S1 normal and S2 normal. No murmur heard.     No friction rub. No gallop.   Pulmonary:      Effort: Pulmonary effort is normal. No respiratory distress.      Breath sounds: Normal breath sounds. No wheezing or rales.    Chest:      Chest wall: No tenderness.   Abdominal:      General: Bowel sounds are normal. There is no distension.      Palpations: Abdomen is soft. There is no mass.      Tenderness: There is no abdominal tenderness. There is no guarding or rebound.   Musculoskeletal:         General: No tenderness. Normal range of motion.      Cervical back: Normal range of motion.   Lymphadenopathy:      Cervical: No cervical adenopathy.   Skin:     General: Skin is warm.      Findings: No erythema or rash.   Neurological:      Mental Status: She is alert and oriented to person, place, and time.      Cranial Nerves: No cranial nerve deficit.      Deep Tendon Reflexes: Reflexes are normal and symmetric.   Psychiatric:         Behavior: Behavior normal.         Thought Content: Thought content normal.         Judgment: Judgment normal.           ASSESSMENT AND PLAN:   Patient is a 38 year old female who presents primarily presents for:    (Z71.3) Weight loss counseling, encounter for  (primary encounter diagnosis)  Plan: DIETITIAN EDUCATION INITIAL, DIET (INTERNAL)          (Z12.31) Screening mammogram for breast cancer  Plan: [facility] MATTHIAS 2D+3D SCREENING BILAT         (CPT=77067/87328)            (E78.5) Hyperlipidemia, unspecified hyperlipidemia type  Plan: COMP METABOLIC PANEL [43075] [Q], LIPID PANEL         [7600] [Q], CANCELED: Lipid Panel            (R63.5) Weight gain  Plan: VITAMIN D, SCREEN [23423][Q], CBC [6399] [Q],         COMP METABOLIC PANEL [06677] [Q], HGB A1C         (Glycohemoglobin) [496] [Q], LIPID PANEL [7600]        [Q], CANCELED: Hemoglobin A1C (Glycohemoglobin)        [E], CANCELED: Comp Metabolic Panel (14) [E],         CANCELED: CBC With Differential With Platelet            (R53.83) Other fatigue  Plan: VITAMIN D, SCREEN [61964][Q], FERRITIN [457]         [Q], IRON AND TIBC [7573] [Q], CBC [6399] [Q],         COMP METABOLIC PANEL [83782] [Q], HGB A1C         (Glycohemoglobin) [496] [Q], LIPID PANEL  [7600]        [Q], CANCELED: Iron And Tibc [E], CANCELED:         Ferritin [E], CANCELED: Vitamin D [E]                    Meds & Refills for this Visit:  Requested Prescriptions      No prescriptions requested or ordered in this encounter       No orders of the defined types were placed in this encounter.      Imaging & Consults:  None        Alfonso Cedeno MD     No follow-ups on file.  Important issues to follow up on next visit      Patient indicates understanding of the above recommendations and agrees to the above plan.  Reasurrance and education provided. All questions answered.  Notified to call with any questions, complications, allergies, or worsening or changing symptoms as well as any side effects or complications from the treatments .  Red flags/ ER precautions discussed.    Total time spent was 30 minutes which includes: Preparation to see patient including chart review, reviewing appropriate medical history, counseling and education (diet and exercise), discussing treatment options, ordering appropriate diagnostic tests and documentation.    Alfonso Cedeno MD  Internal Medicine/Primary Care  [facility]

## 2024-06-13 ENCOUNTER — TELEPHONE (OUTPATIENT)
Dept: INTERNAL MEDICINE CLINIC | Facility: CLINIC | Age: 39
End: 2024-06-13

## 2024-06-13 ENCOUNTER — NURSE TRIAGE (OUTPATIENT)
Dept: INTERNAL MEDICINE CLINIC | Facility: CLINIC | Age: 39
End: 2024-06-13

## 2024-06-13 NOTE — TELEPHONE ENCOUNTER
Spoke with patient. She still wants Plaxovid  because she feels it will help. Symptoms were similar last year and it helped. She will complete the antibiotics as advised by PCP. She will go to Clinic near her home again if symptoms worsen.

## 2024-06-13 NOTE — TELEPHONE ENCOUNTER
Patient has ear pain since Friday. She is taking amoxicillin and it has not helped. Now she feels pain in throat, ears, and headaches. She is now sneezing. Patient is wondering why antibiotics is not helping. She wants to be seen today. Please advise.

## 2024-06-13 NOTE — TELEPHONE ENCOUNTER
Pt called requesting a note for work  Pt covid+  To please inform her when note is ready so spouse can come pick it up

## 2024-06-13 NOTE — TELEPHONE ENCOUNTER
No Covid test done. Patient is Covid test is positive today that she did during triage.    Patient states that she had Covid last year and was given Plaxovid.   Reason for Disposition   Earache also present    Answer Assessment - Initial Assessment Questions  1. ONSET: \"When did the throat start hurting?\" (Hours or days ago)       06/12/24  2. SEVERITY: \"How bad is the sore throat?\" (Scale 1-10; mild, moderate or severe)    - MILD (1-3):  Doesn't interfere with eating or normal activities.    - MODERATE (4-7): Interferes with eating some solids and normal activities.    - SEVERE (8-10):  Excruciating pain, interferes with most normal activities.    - SEVERE WITH DYSPHAGIA (10): Can't swallow liquids, drooling.      Moderate   3. STREP EXPOSURE: \"Has there been any exposure to strep within the past week?\" If Yes, ask: \"What type of contact occurred?\"       Daughter had strep / patient strep was negative in the office   4.  VIRAL SYMPTOMS: \"Are there any symptoms of a cold, such as a runny nose, cough, hoarse voice or red eyes?\"       Sneezing   5. FEVER: \"Do you have a fever?\" If Yes, ask: \"What is your temperature, how was it measured, and when did it start?\"     Forehead  99  6. PUS ON THE TONSILS: \"Is there pus on the tonsils in the back of your throat?\"      She can not see cause it hurts too much.   7. OTHER SYMPTOMS: \"Do you have any other symptoms?\" (e.g., difficulty breathing, headache, rash)      Bilateral ear pain since 06/07/24/ headache/ she scan feel that her tonsils are large.    8. PREGNANCY: \"Is there any chance you are pregnant?\" \"When was your last menstrual period?\"     06/03/24    Protocols used: Sore Throat-A-OH

## 2024-06-17 NOTE — TELEPHONE ENCOUNTER
Spoke to patient, patient had a virtual appointment with [facility] on 06/13/2024, they provided her with a work note

## 2024-06-30 ENCOUNTER — HOSPITAL ENCOUNTER (EMERGENCY)
Facility: HOSPITAL | Age: 39
Discharge: HOME | End: 2024-06-30
Attending: EMERGENCY MEDICINE
Payer: COMMERCIAL

## 2024-06-30 ENCOUNTER — APPOINTMENT (OUTPATIENT)
Dept: ULTRASOUND IMAGING | Facility: HOSPITAL | Age: 39
End: 2024-06-30
Attending: EMERGENCY MEDICINE
Payer: COMMERCIAL

## 2024-06-30 VITALS
OXYGEN SATURATION: 98 % | BODY MASS INDEX: 28.89 KG/M2 | RESPIRATION RATE: 15 BRPM | WEIGHT: 157 LBS | DIASTOLIC BLOOD PRESSURE: 82 MMHG | HEART RATE: 67 BPM | SYSTOLIC BLOOD PRESSURE: 122 MMHG | HEIGHT: 62 IN | TEMPERATURE: 99 F

## 2024-06-30 DIAGNOSIS — R10.13 EPIGASTRIC PAIN: Primary | ICD-10-CM

## 2024-06-30 LAB
ALBUMIN SERPL-MCNC: 4.7 G/DL (ref 3.2–4.8)
ALP LIVER SERPL-CCNC: 69 U/L
ALT SERPL-CCNC: 19 U/L
ANION GAP SERPL CALC-SCNC: 7 MMOL/L (ref 0–18)
AST SERPL-CCNC: 15 U/L (ref ?–34)
B-HCG UR QL: NEGATIVE
BASOPHILS # BLD AUTO: 0.03 X10(3) UL (ref 0–0.2)
BASOPHILS NFR BLD AUTO: 0.3 %
BILIRUB DIRECT SERPL-MCNC: 0.1 MG/DL (ref ?–0.3)
BILIRUB SERPL-MCNC: 0.5 MG/DL (ref 0.3–1.2)
BUN BLD-MCNC: 11 MG/DL (ref 9–23)
BUN/CREAT SERPL: 18.3 (ref 10–20)
CALCIUM BLD-MCNC: 9.4 MG/DL (ref 8.7–10.4)
CHLORIDE SERPL-SCNC: 108 MMOL/L (ref 98–112)
CO2 SERPL-SCNC: 26 MMOL/L (ref 21–32)
CREAT BLD-MCNC: 0.6 MG/DL
DEPRECATED RDW RBC AUTO: 42.5 FL (ref 35.1–46.3)
EGFRCR SERPLBLD CKD-EPI 2021: 116 ML/MIN/1.73M2 (ref 60–?)
EOSINOPHIL # BLD AUTO: 0.05 X10(3) UL (ref 0–0.7)
EOSINOPHIL NFR BLD AUTO: 0.5 %
ERYTHROCYTE [DISTWIDTH] IN BLOOD BY AUTOMATED COUNT: 12.8 % (ref 11–15)
GLUCOSE BLD-MCNC: 106 MG/DL (ref 70–99)
HCT VFR BLD AUTO: 37.9 %
HGB BLD-MCNC: 12.4 G/DL
IMM GRANULOCYTES # BLD AUTO: 0.03 X10(3) UL (ref 0–1)
IMM GRANULOCYTES NFR BLD: 0.3 %
LIPASE SERPL-CCNC: 40 U/L (ref 13–75)
LYMPHOCYTES # BLD AUTO: 2.66 X10(3) UL (ref 1–4)
LYMPHOCYTES NFR BLD AUTO: 28.9 %
MCH RBC QN AUTO: 29.7 PG (ref 26–34)
MCHC RBC AUTO-ENTMCNC: 32.7 G/DL (ref 31–37)
MCV RBC AUTO: 90.9 FL
MONOCYTES # BLD AUTO: 0.43 X10(3) UL (ref 0.1–1)
MONOCYTES NFR BLD AUTO: 4.7 %
NEUTROPHILS # BLD AUTO: 6.02 X10 (3) UL (ref 1.5–7.7)
NEUTROPHILS # BLD AUTO: 6.02 X10(3) UL (ref 1.5–7.7)
NEUTROPHILS NFR BLD AUTO: 65.3 %
OSMOLALITY SERPL CALC.SUM OF ELEC: 292 MOSM/KG (ref 275–295)
PLATELET # BLD AUTO: 251 10(3)UL (ref 150–450)
POTASSIUM SERPL-SCNC: 3.6 MMOL/L (ref 3.5–5.1)
PROT SERPL-MCNC: 7.8 G/DL (ref 5.7–8.2)
RBC # BLD AUTO: 4.17 X10(6)UL
SODIUM SERPL-SCNC: 141 MMOL/L (ref 136–145)
WBC # BLD AUTO: 9.2 X10(3) UL (ref 4–11)

## 2024-06-30 PROCEDURE — 76705 ECHO EXAM OF ABDOMEN: CPT | Performed by: EMERGENCY MEDICINE

## 2024-06-30 PROCEDURE — 99284 EMERGENCY DEPT VISIT MOD MDM: CPT

## 2024-06-30 PROCEDURE — 85025 COMPLETE CBC W/AUTO DIFF WBC: CPT | Performed by: EMERGENCY MEDICINE

## 2024-06-30 PROCEDURE — 81025 URINE PREGNANCY TEST: CPT

## 2024-06-30 PROCEDURE — 99285 EMERGENCY DEPT VISIT HI MDM: CPT

## 2024-06-30 PROCEDURE — 80048 BASIC METABOLIC PNL TOTAL CA: CPT | Performed by: EMERGENCY MEDICINE

## 2024-06-30 PROCEDURE — 80076 HEPATIC FUNCTION PANEL: CPT | Performed by: EMERGENCY MEDICINE

## 2024-06-30 PROCEDURE — 83690 ASSAY OF LIPASE: CPT | Performed by: EMERGENCY MEDICINE

## 2024-06-30 PROCEDURE — 36415 COLL VENOUS BLD VENIPUNCTURE: CPT

## 2024-06-30 RX ORDER — FAMOTIDINE 20 MG/1
20 TABLET, FILM COATED ORAL ONCE
Status: COMPLETED | OUTPATIENT
Start: 2024-06-30 | End: 2024-06-30

## 2024-06-30 RX ORDER — MAGNESIUM HYDROXIDE/ALUMINUM HYDROXICE/SIMETHICONE 120; 1200; 1200 MG/30ML; MG/30ML; MG/30ML
30 SUSPENSION ORAL ONCE
Status: COMPLETED | OUTPATIENT
Start: 2024-06-30 | End: 2024-06-30

## 2024-06-30 RX ORDER — PANTOPRAZOLE SODIUM 40 MG/1
40 TABLET, DELAYED RELEASE ORAL DAILY
Qty: 30 TABLET | Refills: 0 | Status: SHIPPED | OUTPATIENT
Start: 2024-06-30 | End: 2024-07-30

## 2024-06-30 RX ORDER — MAGNESIUM HYDROXIDE/ALUMINUM HYDROXICE/SIMETHICONE 120; 1200; 1200 MG/30ML; MG/30ML; MG/30ML
10 SUSPENSION ORAL 4 TIMES DAILY PRN
Qty: 710 ML | Refills: 0 | Status: SHIPPED | OUTPATIENT
Start: 2024-06-30

## 2024-06-30 NOTE — ED INITIAL ASSESSMENT (HPI)
Pt to the ed for abdominal and epigastric pain that began today at 11am  Denies nausea or vomiting

## 2024-07-01 NOTE — ED PROVIDER NOTES
Patient Seen in: [facility] Emergency Department    History     Chief Complaint   Patient presents with    Abdominal Pain       HPI    The patient presents to the ED complaining of epigastric pain starting around 11 AM this morning.  She states pain has been fairly consistent throughout the day.  No associated symptoms and pain does not radiate.  Denies other complaints.  Patient states she was sent from immediate care for ultrasound of her gallbladder.    History reviewed. History reviewed. No pertinent past medical history.    History reviewed. History reviewed. No pertinent surgical history.      Medications :  (Not in a hospital admission)       Family History   Problem Relation Age of Onset    Other (Other) Father         alcoholisim    No Known Problems Mother     Diabetes Sister     No Known Problems Brother     Cancer Sister         bone       Smoking Status:   Social History     Socioeconomic History    Marital status: Single   Tobacco Use    Smoking status: Never    Smokeless tobacco: Never   Substance and Sexual Activity    Alcohol use: Never    Drug use: Never       Constitutional and vital signs reviewed.      Social History and Family History elements reviewed from today, pertinent positives to the presenting problem noted.    Physical Exam     ED Triage Vitals [06/30/24 1719]   /85   Pulse 73   Resp 18   Temp 98.5 °F (36.9 °C)   Temp src Oral   SpO2 99 %   O2 Device None (Room air)       All measures to prevent infection transmission during my interaction with the patient were taken. Handwashing was performed prior to and after the exam.  Stethoscope and any equipment used during my examination was cleaned with super sani-cloth germicidal wipes following the exam.     Physical Exam  Vitals and nursing note reviewed.   Constitutional:       General: She is not in acute distress.     Appearance: She is well-developed. She is not ill-appearing or toxic-appearing.   HENT:      Head:  Normocephalic and atraumatic.   Eyes:      General:         Right eye: No discharge.         Left eye: No discharge.      Conjunctiva/sclera: Conjunctivae normal.   Neck:      Trachea: No tracheal deviation.   Cardiovascular:      Rate and Rhythm: Normal rate.   Pulmonary:      Effort: Pulmonary effort is normal. No respiratory distress.      Breath sounds: No stridor.   Abdominal:      General: There is no distension.      Palpations: Abdomen is soft.      Tenderness: There is abdominal tenderness in the epigastric area. There is no guarding or rebound.   Musculoskeletal:         General: No deformity.   Skin:     General: Skin is warm and dry.   Neurological:      Mental Status: She is alert and oriented to person, place, and time.   Psychiatric:         Mood and Affect: Mood normal.         Behavior: Behavior normal.         ED Course        Labs Reviewed   BASIC METABOLIC PANEL (8) - Abnormal; Notable for the following components:       Result Value    Glucose 106 (*)     All other components within normal limits   HEPATIC FUNCTION PANEL (7) - Normal   LIPASE - Normal   POCT PREGNANCY URINE - Normal   CBC WITH DIFFERENTIAL WITH PLATELET    Narrative:     The following orders were created for panel order CBC With Differential With Platelet.                  Procedure                               Abnormality         Status                                     ---------                               -----------         ------                                     CBC W/ DIFFERENTIAL[[id number]]                              Final result                                                 Please view results for these tests on the individual orders.   CBC W/ DIFFERENTIAL       As Interpreted by me    Imaging Results Available and Reviewed while in ED: US GALLBLADDER (CPT=76705)    Result Date: 6/30/2024  CONCLUSION:  1. No evidence of cholelithiasis or additional sonographic manifestations of acute cholecystitis. 2. No  biliary ductal dilation. 3. Probable tiny 3 mm gallbladder polyp.  Given small size, this is typically considered incidental and requires no additional follow-up. 4. Lesser incidental findings as above.   Dictated by (CST): Buck Eduardo MD on 6/30/2024 at 6:48 PM     Finalized by (CST): Buck Eduardo MD on 6/30/2024 at 6:50 PM         ED Medications Administered:   Medications   alum-mag hydroxide-simethicone (Maalox) 200-200-20 MG/5ML oral suspension 30 mL (30 mL Oral Given 6/30/24 1811)   famotidine (Pepcid) tab 20 mg (20 mg Oral Given 6/30/24 1811)         MDM     Vitals:    06/30/24 1719 06/30/24 1800 06/30/24 1900   BP: 136/85 133/82 122/82   Pulse: 73 65 67   Resp: 18 17 15   Temp: 98.5 °F (36.9 °C)     TempSrc: Oral     SpO2: 99% 100% 98%   Weight: 71.2 kg     Height: 157.5 cm (5' 2\")       *I personally reviewed and interpreted all ED vitals.    Pulse Ox: 98%, Room air, Normal     Differential Diagnosis/ Diagnostic Considerations: GERD, gastritis, PUD, pancreatitis, biliary colic, other    Complicating Factors: The patient already has does not have a problem list on file. to contribute to the complexity of this ED evaluation.    Medical Decision Making  The patient presents to the ED with epigastric abdominal pain.  Nondistressed on examination.  Mild epigastric tenderness on evaluation.  Patient was given GI medication and pain resolved.  Laboratory workup without abnormality and ultrasound without evidence for gallbladder disease.  Patient reassured and advised on continue medication at home with GI follow-up.    Problems Addressed:  Epigastric pain: acute illness or injury that poses a threat to life or bodily functions    Amount and/or Complexity of Data Reviewed  Labs: ordered. Decision-making details documented in ED Course.  Radiology: ordered and independent interpretation performed. Decision-making details documented in ED Course.     Details: I personally reviewed the patient's gallbladder  ultrasound images and noted no CBD dilation    Risk  Prescription drug management.        Condition upon leaving the department: Stable    Disposition and Plan     Clinical Impression:  1. Epigastric pain        Disposition:  Discharge    Follow-up:  Alfonso Cedeno MD  [address]  [facility] [id number] [phone]    Schedule an appointment as soon as possible for a visit in 3 day(s)      Sofiya Valdez MD  [address]  [facility] [id number] [phone]    Schedule an appointment as soon as possible for a visit in 3 week(s)        Medications Prescribed:  Discharge Medication List as of 6/30/2024  7:23 PM        START taking these medications    Details   pantoprazole 40 MG Oral Tab EC Take 1 tablet (40 mg total) by mouth daily for 30 doses., Normal, Disp-30 tablet, R-0      alum-mag hydroxide-simethicone 200-200-20 MG/5ML Oral Suspension Take 10 mL by mouth 4 (four) times daily as needed., Normal, Disp-710 mL, R-0

## 2024-08-06 LAB
% SATURATION: 46 % (CALC) (ref 16–45)
ABSOLUTE BASOPHILS: 18 CELLS/UL (ref 0–200)
ABSOLUTE EOSINOPHILS: 148 CELLS/UL (ref 15–500)
ABSOLUTE LYMPHOCYTES: 2207 CELLS/UL (ref 850–3900)
ABSOLUTE MONOCYTES: 407 CELLS/UL (ref 200–950)
ABSOLUTE NEUTROPHILS: 3121 CELLS/UL (ref 1500–7800)
ALBUMIN/GLOBULIN RATIO: 1.3 (CALC) (ref 1–2.5)
ALBUMIN: 4.3 G/DL (ref 3.6–5.1)
ALKALINE PHOSPHATASE: 70 U/L (ref 31–125)
ALT: 14 U/L (ref 6–29)
AST: 16 U/L (ref 10–30)
BASOPHILS: 0.3 %
BILIRUBIN, TOTAL: 0.5 MG/DL (ref 0.2–1.2)
BUN: 8 MG/DL (ref 7–25)
CALCIUM: 9.4 MG/DL (ref 8.6–10.2)
CARBON DIOXIDE: 24 MMOL/L (ref 20–32)
CHLORIDE: 104 MMOL/L (ref 98–110)
CHOL/HDLC RATIO: 4.3 (CALC)
CHOLESTEROL, TOTAL: 239 MG/DL
CREATININE: 0.51 MG/DL (ref 0.5–0.99)
EGFR: 121 ML/MIN/1.73M2
EOSINOPHILS: 2.5 %
FERRITIN: 69 NG/ML (ref 16–154)
GLOBULIN: 3.2 G/DL (CALC) (ref 1.9–3.7)
GLUCOSE: 100 MG/DL (ref 65–99)
HDL CHOLESTEROL: 55 MG/DL
HEMATOCRIT: 41.3 % (ref 35–45)
HEMOGLOBIN A1C: 6 % OF TOTAL HGB
HEMOGLOBIN: 13.4 G/DL (ref 11.7–15.5)
IRON BINDING CAPACITY: 269 MCG/DL (CALC) (ref 250–450)
IRON, TOTAL: 125 MCG/DL (ref 40–190)
LDL-CHOLESTEROL: 143 MG/DL (CALC)
LYMPHOCYTES: 37.4 %
MCH: 29.9 PG (ref 27–33)
MCHC: 32.4 G/DL (ref 32–36)
MCV: 92.2 FL (ref 80–100)
MONOCYTES: 6.9 %
MPV: 11.1 FL (ref 7.5–12.5)
NEUTROPHILS: 52.9 %
NON-HDL CHOLESTEROL: 184 MG/DL (CALC)
PLATELET COUNT: 259 THOUSAND/UL (ref 140–400)
POTASSIUM: 4.1 MMOL/L (ref 3.5–5.3)
PROTEIN, TOTAL: 7.5 G/DL (ref 6.1–8.1)
RDW: 12.6 % (ref 11–15)
RED BLOOD CELL COUNT: 4.48 MILLION/UL (ref 3.8–5.1)
SODIUM: 137 MMOL/L (ref 135–146)
TRIGLYCERIDES: 267 MG/DL
VITAMIN D, 25-OH, TOTAL: 23 NG/ML (ref 30–100)
WHITE BLOOD CELL COUNT: 5.9 THOUSAND/UL (ref 3.8–10.8)

## 2024-08-12 ENCOUNTER — OFFICE VISIT (OUTPATIENT)
Dept: INTERNAL MEDICINE CLINIC | Facility: CLINIC | Age: 39
End: 2024-08-12
Payer: COMMERCIAL

## 2024-08-12 VITALS
WEIGHT: 156 LBS | HEIGHT: 62 IN | DIASTOLIC BLOOD PRESSURE: 80 MMHG | HEART RATE: 78 BPM | OXYGEN SATURATION: 98 % | SYSTOLIC BLOOD PRESSURE: 128 MMHG | BODY MASS INDEX: 28.71 KG/M2

## 2024-08-12 DIAGNOSIS — E78.5 HYPERLIPIDEMIA, UNSPECIFIED HYPERLIPIDEMIA TYPE: ICD-10-CM

## 2024-08-12 DIAGNOSIS — R92.8 ABNORMAL MAMMOGRAM: Primary | ICD-10-CM

## 2024-08-12 DIAGNOSIS — R73.03 PREDIABETES: ICD-10-CM

## 2024-08-12 PROCEDURE — 99214 OFFICE O/P EST MOD 30 MIN: CPT | Performed by: INTERNAL MEDICINE

## 2024-08-12 RX ORDER — PANTOPRAZOLE SODIUM 40 MG/1
40 TABLET, DELAYED RELEASE ORAL
COMMUNITY

## 2024-08-16 NOTE — PROGRESS NOTES
[facility] of [facility]  Return Patient Progress Note      HPI:     Chief Complaint   Patient presents with    Follow - Up     3 month f/u weight gain     Test Results     Purnima results   Lab results        Belinda Laura is a 38 year old female presenting for:    Has a significant  has no past medical history on file.    Here for follow up.  [facility] at [facility] and has results.  Would like to discuss.         Labs:   CMP:  Lab Results   Component Value Date/Time     08/05/2024 10:52 AM    K 4.1 08/05/2024 10:52 AM     08/05/2024 10:52 AM    CO2 24 08/05/2024 10:52 AM    CREATSERUM 0.51 08/05/2024 10:52 AM    CA 9.4 08/05/2024 10:52 AM     (H) 08/05/2024 10:52 AM    TP 7.5 08/05/2024 10:52 AM    ALB 4.3 08/05/2024 10:52 AM    ALKPHO 70 08/05/2024 10:52 AM    AST 16 08/05/2024 10:52 AM    ALT 14 08/05/2024 10:52 AM    BILT 0.5 08/05/2024 10:52 AM        CBC:  Lab Results   Component Value Date    WBC 5.9 08/05/2024    HGB 13.4 08/05/2024    HCT 41.3 08/05/2024     08/05/2024    NEPERCENT 52.9 08/05/2024    LYPERCENT 37.4 08/05/2024    MOPERCENT 6.9 08/05/2024    EOPERCENT 2.5 08/05/2024    BAPERCENT 0.3 08/05/2024    NE 3,121 08/05/2024    LYMABS 2,207 08/05/2024    MOABSO 407 08/05/2024    EOABSO 148 08/05/2024    BAABSO 18 08/05/2024          Hemoglobin A1C, Microalbumin  Lab Results   Component Value Date/Time    A1C 6.0 (H) 08/05/2024 10:52 AM        Lipid panel  Lab Results   Component Value Date/Time    CHOLEST 239 (H) 08/05/2024 10:52 AM    HDL 55 08/05/2024 10:52 AM    TRIG 267 (H) 08/05/2024 10:52 AM     (H) 08/05/2024 10:52 AM    NONHDLC 184 (H) 08/05/2024 10:52 AM        Medications:  Current Outpatient Medications   Medication Sig Dispense Refill    pantoprazole 40 MG Oral Tab EC Take 1 tablet (40 mg total) by mouth every morning before breakfast.      naproxen 500 MG Oral Tab Take 1 tablet (500 mg total) by mouth 2 (two) times  daily as needed. 60 tablet 1    Multiple Vitamin (MULTI-VITAMIN) Oral Tab Take 1 tablet by mouth daily.      alum-mag hydroxide-simethicone 200-200-20 MG/5ML Oral Suspension Take 10 mL by mouth 4 (four) times daily as needed. (Patient not taking: Reported on 8/12/2024) 710 mL 0      PMH:  No past medical history on file.      PSH:  No past surgical history on file.    Allergies:  No Known Allergies   Social History:  Social History     Socioeconomic History    Marital status: Single   Tobacco Use    Smoking status: Never    Smokeless tobacco: Never   Substance and Sexual Activity    Alcohol use: Never    Drug use: Never     Social Determinants of Health     Food Insecurity: No Food Insecurity (3/30/2021)    Received from [facility]    Food Insecurity     Within the past 30 days, I worried whether my food would run out before I got money to buy more. / En los últimos 30 días, me preocupó que la comida se podía acabar antes de tener dinero para compr...: Never true / Nunca     Within the past 30 days, the food that I bought just didn't last, and I didn't have money to get more. / En los últimos 30 días, La comida que compré no rindió lo suficiente, y no tenía dinero para...: Never true / Nunca   Physical Activity: Inactive (2/28/2020)    Received from [facility]    Exercise Vital Sign     Days of Exercise per Week: 0 days     Minutes of Exercise per Session: 0 min   Stress: No Stress Concern Present (2/28/2020)    [facility]    [employer] - Occupational Stress Questionnaire     Feeling of Stress : Only a little      Family History:  Family History   Problem Relation Age of Onset    Other (Other) Father         alcoholisim    No Known Problems Mother     Diabetes Sister     No Known Problems Brother     Cancer Sister          bone              REVIEW OF SYSTEMS:   Review of Systems   Constitutional:  Negative for chills, fatigue, fever and unexpected weight change.   HENT:  Negative for congestion, ear pain, hearing loss, rhinorrhea, sinus pain and sore throat.    Eyes:  Negative for pain, redness and visual disturbance.   Respiratory:  Negative for apnea, cough, chest tightness, shortness of breath and wheezing.    Cardiovascular:  Negative for chest pain, palpitations and leg swelling.   Gastrointestinal:  Negative for abdominal distention, abdominal pain, blood in stool, constipation and nausea.   Endocrine: Negative for cold intolerance, heat intolerance and polyuria.   Genitourinary:  Negative for dysuria, hematuria and urgency.   Musculoskeletal:  Negative for arthralgias, back pain, gait problem, joint swelling, myalgias and neck pain.   Skin:  Negative for rash and wound.   Allergic/Immunologic: Negative for food allergies and immunocompromised state.   Neurological:  Negative for dizziness, seizures, facial asymmetry, speech difficulty, weakness, light-headedness, numbness and headaches.   Hematological:  Negative for adenopathy. Does not bruise/bleed easily.   Psychiatric/Behavioral:  Negative for behavioral problems, sleep disturbance and suicidal ideas. The patient is not nervous/anxious.             PHYSICAL EXAM:   /80   Pulse 78   Ht 5' 2\" (1.575 m)   Wt 156 lb (70.8 kg)   LMP 06/03/2024 (Exact Date)   SpO2 98%   BMI 28.53 kg/m²  Estimated body mass index is 28.53 kg/m² as calculated from the following:    Height as of this encounter: 5' 2\" (1.575 m).    Weight as of this encounter: 156 lb (70.8 kg).     Wt Readings from Last 3 Encounters:   08/12/24 156 lb (70.8 kg)   06/30/24 157 lb (71.2 kg)   05/07/24 159 lb (72.1 kg)       Physical Exam  Vitals reviewed.   Constitutional:       General: She is not in acute distress.     Appearance: She is well-developed.   HENT:      Head: Normocephalic and  atraumatic.   Eyes:      Conjunctiva/sclera: Conjunctivae normal.      Pupils: Pupils are equal, round, and reactive to light.   Neck:      Thyroid: No thyromegaly.   Cardiovascular:      Rate and Rhythm: Normal rate and regular rhythm.      Heart sounds: Normal heart sounds, S1 normal and S2 normal. No murmur heard.     No friction rub. No gallop.   Pulmonary:      Effort: Pulmonary effort is normal. No respiratory distress.      Breath sounds: Normal breath sounds. No wheezing or rales.   Chest:      Chest wall: No tenderness.   Abdominal:      General: Bowel sounds are normal. There is no distension.      Palpations: Abdomen is soft. There is no mass.      Tenderness: There is no abdominal tenderness. There is no guarding or rebound.   Musculoskeletal:         General: No tenderness. Normal range of motion.      Cervical back: Normal range of motion.   Lymphadenopathy:      Cervical: No cervical adenopathy.   Skin:     General: Skin is warm.      Findings: No erythema or rash.   Neurological:      Mental Status: She is alert and oriented to person, place, and time.      Cranial Nerves: No cranial nerve deficit.      Deep Tendon Reflexes: Reflexes are normal and symmetric.   Psychiatric:         Behavior: Behavior normal.         Thought Content: Thought content normal.         Judgment: Judgment normal.               ASSESSMENT AND PLAN:   Patient is a 38 year old female who presents primarily presents for:    (R92.8) Abnormal mammogram  (primary encounter diagnosis)  Plan: [facility] DIAGNOSTIC BILATERAL (CPT=77066), US BREAST        BILATERAL COMPLETE (CPT=76641-50)          Assymetry noted.  Will proceed with diagnostic.      (R73.03) Prediabetes  Plan: Hemoglobin A1C (Glycohemoglobin) [E]  Discussed recent result on 8.6.24            (E78.5) Hyperlipidemia, unspecified hyperlipidemia type  Plan: Lipid Panel, Comp Metabolic Panel (14) [E]                      Health Maintenance:    Health Maintenance   Topic Date Due     Mammogram  Never done    DTaP,Tdap,and Td Vaccines (1 - Tdap) Never done    COVID-19 Vaccine (4 - 2023-24 season) 09/01/2023    Annual Physical  11/07/2024    Influenza Vaccine (1) 10/01/2024    Pap Smear  05/16/2025    Annual Depression Screening  Completed    Pneumococcal Vaccine: Birth to 64yrs  Aged Out         Meds & Refills for this Visit:  Requested Prescriptions      No prescriptions requested or ordered in this encounter       Orders Placed This Encounter   Procedures    Hemoglobin A1C (Glycohemoglobin) [E]    Lipid Panel    Comp Metabolic Panel (14) [E]       Imaging & Consults:  [facility] DIAGNOSTIC BILATERAL (CPT=77066)  US BREAST BILATERAL COMPLETE (CPT=76641-50)          Return in about 3 months (around 11/12/2024).  Important follow up notes/labs for next visit      Patient indicates understanding of the above recommendations and agrees to the above plan.  Reasurrance and education provided. All questions answered.    Notified to call with any questions, complications, allergies, or worsening or changing symptoms as well as any side effects or complications from the treatments .  Red flags/ ER precautions discussed.    If diagnostic labs or imaging ordered advised patient to contact my office for results  24-48 hours after completion    This note was dictated using dragon speech recognition transcription software.  Typographical and transcription errors may be present.  Please call if any questions.             Alfonso Cedeno MD  [facility]

## 2024-09-30 ENCOUNTER — TELEPHONE (OUTPATIENT)
Dept: INTERNAL MEDICINE CLINIC | Facility: CLINIC | Age: 39
End: 2024-09-30

## 2024-09-30 NOTE — TELEPHONE ENCOUNTER
Spoke with  Kourtney # [id number] to Belinda informed her Dr. Cedeno had entered diagnostic bilateral mammogram and US breast. Patient scheduled with [facility] and requesting orders faxed to [facility] fax number  [phone].      Breast diagnostic and US orders faxed to [facility] at  [phone].

## 2024-10-21 ENCOUNTER — TELEPHONE (OUTPATIENT)
Dept: INTERNAL MEDICINE CLINIC | Facility: CLINIC | Age: 39
End: 2024-10-21

## 2024-10-21 DIAGNOSIS — N60.02 BILATERAL BREAST CYSTS: Primary | ICD-10-CM

## 2024-10-21 DIAGNOSIS — N60.01 BILATERAL BREAST CYSTS: Primary | ICD-10-CM

## 2024-10-21 NOTE — TELEPHONE ENCOUNTER
Reviewed imaging from [facility] imaging.      Bilateral diagnostic mammogram and Ultrasound of all 4 quadrants of right and left breast performed.  Small complex cyst in the right and left breast.  It is recommended that she repeat a bilateral ultrasound in 6 months to demonstrate stability.      Small complex cysts bilaterally.      US in 5-6 months recommended.   I have entered order.  If there is any change such a pain, concern for possible growth of cyst patient should let me know immedicately.     If she is currently experiencing any pain should follow with breast surgeon.  I have entered a referral if this is the case.    Dr Cedeno

## 2024-11-10 LAB
ALBUMIN/GLOBULIN RATIO: 1.5 (CALC) (ref 1–2.5)
ALBUMIN: 4.2 G/DL (ref 3.6–5.1)
ALKALINE PHOSPHATASE: 70 U/L (ref 31–125)
ALT: 12 U/L (ref 6–29)
AST: 14 U/L (ref 10–30)
BILIRUBIN, TOTAL: 0.4 MG/DL (ref 0.2–1.2)
BUN: 12 MG/DL (ref 7–25)
CALCIUM: 9.1 MG/DL (ref 8.6–10.2)
CARBON DIOXIDE: 23 MMOL/L (ref 20–32)
CHLORIDE: 108 MMOL/L (ref 98–110)
CHOL/HDLC RATIO: 4.4 (CALC)
CHOLESTEROL, TOTAL: 205 MG/DL
CREATININE: 0.55 MG/DL (ref 0.5–0.99)
EGFR: 119 ML/MIN/1.73M2
GLOBULIN: 2.8 G/DL (CALC) (ref 1.9–3.7)
GLUCOSE: 106 MG/DL (ref 65–99)
HDL CHOLESTEROL: 47 MG/DL
HEMOGLOBIN A1C: 6.2 % OF TOTAL HGB
LDL-CHOLESTEROL: 126 MG/DL (CALC)
NON-HDL CHOLESTEROL: 158 MG/DL (CALC)
POTASSIUM: 4.2 MMOL/L (ref 3.5–5.3)
PROTEIN, TOTAL: 7 G/DL (ref 6.1–8.1)
SODIUM: 139 MMOL/L (ref 135–146)
TRIGLYCERIDES: 197 MG/DL

## 2024-11-12 ENCOUNTER — OFFICE VISIT (OUTPATIENT)
Dept: INTERNAL MEDICINE CLINIC | Facility: CLINIC | Age: 39
End: 2024-11-12
Payer: COMMERCIAL

## 2024-11-12 VITALS
BODY MASS INDEX: 27.97 KG/M2 | OXYGEN SATURATION: 99 % | HEART RATE: 71 BPM | DIASTOLIC BLOOD PRESSURE: 80 MMHG | SYSTOLIC BLOOD PRESSURE: 124 MMHG | HEIGHT: 62 IN | WEIGHT: 152 LBS

## 2024-11-12 DIAGNOSIS — R73.03 PREDIABETES: ICD-10-CM

## 2024-11-12 DIAGNOSIS — N64.4 BREAST PAIN: ICD-10-CM

## 2024-11-12 DIAGNOSIS — Z00.00 ANNUAL PHYSICAL EXAM: Primary | ICD-10-CM

## 2024-11-12 DIAGNOSIS — E78.5 HYPERLIPIDEMIA, UNSPECIFIED HYPERLIPIDEMIA TYPE: ICD-10-CM

## 2024-11-12 PROCEDURE — 90656 IIV3 VACC NO PRSV 0.5 ML IM: CPT | Performed by: INTERNAL MEDICINE

## 2024-11-12 PROCEDURE — 99213 OFFICE O/P EST LOW 20 MIN: CPT | Performed by: INTERNAL MEDICINE

## 2024-11-12 PROCEDURE — 99396 PREV VISIT EST AGE 40-64: CPT | Performed by: INTERNAL MEDICINE

## 2024-11-12 PROCEDURE — 90471 IMMUNIZATION ADMIN: CPT | Performed by: INTERNAL MEDICINE

## 2024-11-20 NOTE — PROGRESS NOTES
[facility] part of [facility]  Return Patient Progress Note      HPI:     Chief Complaint   Patient presents with    Physical    Breast Pain     Bilateral        Belinda Laura is a 39 year old female presenting for:    Has a significant  has no past medical history on file.    39-year-old female with PMH of HLD and prediabetes presents for annual visit and lab result discussion. No significant symptoms or concerns reported beyond current conditions.    - Prediabetes: Currently noted with an A1C of 6.2.  - HLD: Noted without further specifics provided.    Patient expresses interest in dietary and lifestyle modifications to manage both conditions and understands the information provided during the discussion.  Labs:   CMP:  Lab Results   Component Value Date/Time     11/09/2024 08:26 AM    K 4.2 11/09/2024 08:26 AM     11/09/2024 08:26 AM    CO2 23 11/09/2024 08:26 AM    CREATSERUM 0.55 11/09/2024 08:26 AM    CA 9.1 11/09/2024 08:26 AM     (H) 11/09/2024 08:26 AM    TP 7.0 11/09/2024 08:26 AM    ALB 4.2 11/09/2024 08:26 AM    ALKPHO 70 11/09/2024 08:26 AM    AST 14 11/09/2024 08:26 AM    ALT 12 11/09/2024 08:26 AM    BILT 0.4 11/09/2024 08:26 AM        CBC:  Lab Results   Component Value Date    WBC 5.9 08/05/2024    HGB 13.4 08/05/2024    HCT 41.3 08/05/2024     08/05/2024    NEPERCENT 52.9 08/05/2024    LYPERCENT 37.4 08/05/2024    MOPERCENT 6.9 08/05/2024    EOPERCENT 2.5 08/05/2024    BAPERCENT 0.3 08/05/2024    NE 3,121 08/05/2024    LYMABS 2,207 08/05/2024    MOABSO 407 08/05/2024    EOABSO 148 08/05/2024    BAABSO 18 08/05/2024          Hemoglobin A1C, Microalbumin  Lab Results   Component Value Date/Time    A1C 6.2 (H) 11/09/2024 08:26 AM        Lipid panel  Lab Results   Component Value Date/Time    CHOLEST 205 (H) 11/09/2024 08:26 AM    HDL 47 (L) 11/09/2024 08:26 AM    TRIG 197 (H) 11/09/2024 08:26 AM     (H) 11/09/2024 08:26 AM    NONHDLC 158 (H)  11/09/2024 08:26 AM        Medications:  Current Outpatient Medications   Medication Sig Dispense Refill    naproxen 500 MG Oral Tab Take 1 tablet (500 mg total) by mouth 2 (two) times daily as needed. 60 tablet 1    Multiple Vitamin (MULTI-VITAMIN) Oral Tab Take 1 tablet by mouth daily.        PMH:  History reviewed. No pertinent past medical history.            REVIEW OF SYSTEMS:   Review of Systems   Constitutional:  Negative for chills, fatigue, fever and unexpected weight change.   HENT:  Negative for congestion, ear pain, hearing loss, rhinorrhea, sinus pain and sore throat.    Eyes:  Negative for pain, redness and visual disturbance.   Respiratory:  Negative for apnea, cough, chest tightness, shortness of breath and wheezing.    Cardiovascular:  Negative for chest pain, palpitations and leg swelling.   Gastrointestinal:  Negative for abdominal distention, abdominal pain, blood in stool, constipation and nausea.   Endocrine: Negative for cold intolerance, heat intolerance and polyuria.   Genitourinary:  Negative for dysuria, hematuria and urgency.        Breast pain   Musculoskeletal:  Negative for arthralgias, back pain, gait problem, joint swelling, myalgias and neck pain.   Skin:  Negative for rash and wound.   Allergic/Immunologic: Negative for food allergies and immunocompromised state.   Neurological:  Negative for dizziness, seizures, facial asymmetry, speech difficulty, weakness, light-headedness, numbness and headaches.   Hematological:  Negative for adenopathy. Does not bruise/bleed easily.   Psychiatric/Behavioral:  Negative for behavioral problems, sleep disturbance and suicidal ideas. The patient is not nervous/anxious.             PHYSICAL EXAM:   /80   Pulse 71   Ht 5' 2\" (1.575 m)   Wt 152 lb (68.9 kg)   LMP 06/03/2024 (Exact Date)   SpO2 99%   BMI 27.80 kg/m²  Estimated body mass index is 27.8 kg/m² as calculated from the following:    Height as of this encounter: 5' 2\" (1.575  m).    Weight as of this encounter: 152 lb (68.9 kg).     Wt Readings from Last 3 Encounters:   11/12/24 152 lb (68.9 kg)   08/12/24 156 lb (70.8 kg)   06/30/24 157 lb (71.2 kg)       Physical Exam  Vitals reviewed.   Constitutional:       General: She is not in acute distress.     Appearance: She is well-developed.   HENT:      Head: Normocephalic and atraumatic.   Eyes:      Conjunctiva/sclera: Conjunctivae normal.      Pupils: Pupils are equal, round, and reactive to light.   Neck:      Thyroid: No thyromegaly.   Cardiovascular:      Rate and Rhythm: Normal rate and regular rhythm.      Heart sounds: Normal heart sounds, S1 normal and S2 normal. No murmur heard.     No friction rub. No gallop.   Pulmonary:      Effort: Pulmonary effort is normal. No respiratory distress.      Breath sounds: Normal breath sounds. No wheezing or rales.   Chest:      Chest wall: No tenderness.   Abdominal:      General: Bowel sounds are normal. There is no distension.      Palpations: Abdomen is soft. There is no mass.      Tenderness: There is no abdominal tenderness. There is no guarding or rebound.   Musculoskeletal:         General: No tenderness. Normal range of motion.      Cervical back: Normal range of motion.   Lymphadenopathy:      Cervical: No cervical adenopathy.   Skin:     General: Skin is warm.      Findings: No erythema or rash.   Neurological:      Mental Status: She is alert and oriented to person, place, and time.      Cranial Nerves: No cranial nerve deficit.      Deep Tendon Reflexes: Reflexes are normal and symmetric.   Psychiatric:         Behavior: Behavior normal.         Thought Content: Thought content normal.         Judgment: Judgment normal.               ASSESSMENT AND PLAN:   Patient is a 39 year old female who presents primarily presents for:    Assessment and Plan:    1. Annual Physical Examination:  Discussed age-appropriate screening and vaccine counseling.    2. Prediabetes (A1C 6.2%) and  Hyperlipidemia:     - Discussed dietary changes and lifestyle modifications for management. Follow up in 6 months for repeat labs.    3.  Breast pain  Recent mammogram done bilaterally.  Still having pain. Will schedule with Dr. Arreaga.     4. Follow-up:     - Addressed all questions and concerns. Scheduled 6-month follow-up to monitor progress and reevaluate labs.        Health Maintenance:    Health Maintenance   Topic Date Due    DTaP,Tdap,and Td Vaccines (1 - Tdap) Never done    COVID-19 Vaccine (4 - 2024-25 season) 09/01/2024    Annual Physical  11/07/2024    Pap Smear  05/16/2025    Mammogram  04/17/2025 (Originally 11/16/1985)    Influenza Vaccine  Completed    Annual Depression Screening  Completed    Pneumococcal Vaccine: Birth to 64yrs  Aged Out         Meds & Refills for this Visit:  Requested Prescriptions      No prescriptions requested or ordered in this encounter       Orders Placed This Encounter   Procedures    Fluzone trivalent vaccine, PF 0.5mL, 6mo+ (34145)       Imaging & Consults:  INFLUENZA VACCINE, TRI, PRESERV FREE, 0.5 ML          No follow-ups on file.  Important follow up notes/labs for next visit      Patient indicates understanding of the above recommendations and agrees to the above plan.  Reasurrance and education provided. All questions answered.    Notified to call with any questions, complications, allergies, or worsening or changing symptoms as well as any side effects or complications from the treatments .  Red flags/ ER precautions discussed.    If diagnostic labs or imaging ordered advised patient to contact my office for results  24-48 hours after completion    This note was dictated using dragon speech recognition transcription software.  Typographical and transcription errors may be present.  Please call if any questions.       As part of our commitment to providing you with comprehensive, transparent, and timely access to your health information, we adhere to the  guidelines set forth by the 21st Century Cures Act. This Act enhances your rights to access your electronic health information and ensures that you can easily obtain your medical records.  Please note that the verbage used in this note is intended for medical documentation and communication and may be interpreted as forthright.  Please do not hesitate to contact my office if you have any questions.            Alfonso Cedeno MD  [facility]

## 2024-11-21 ENCOUNTER — TELEPHONE (OUTPATIENT)
Dept: SURGERY | Facility: CLINIC | Age: 39
End: 2024-11-21

## 2024-11-21 NOTE — TELEPHONE ENCOUNTER
Spoke to patient, disc dropped off only included US images. Asked patient to request disc of mammogram/US from 10/7/24 and 8/7/24. Patient acknowledged and will drop off to clinic. 11/26/24 appt cancelled for now, will reach out to patient to schedule once imaging reviewed by Physician. Patient agreeable and thankful for call.

## 2024-11-27 ENCOUNTER — TELEPHONE (OUTPATIENT)
Dept: SURGERY | Facility: CLINIC | Age: 39
End: 2024-11-27

## 2024-12-02 ENCOUNTER — TELEPHONE (OUTPATIENT)
Age: 39
End: 2024-12-02

## 2024-12-02 NOTE — TELEPHONE ENCOUNTER
Patient called, c/o breast pain for the last week.     Received missing imaging from [facility] today and it was uploaded. Will ask Physician to review today or tomorrow.     To follow-up with patient with physician's recommendations and assist with scheduling.

## 2024-12-04 ENCOUNTER — TELEPHONE (OUTPATIENT)
Dept: SURGERY | Facility: CLINIC | Age: 39
End: 2024-12-04

## 2024-12-04 NOTE — TELEPHONE ENCOUNTER
10/17/24 Bilateral diagnostic mammogram with Ultrasound, interpretation    Sending for scanning, US Interpretation not previously received.

## 2024-12-10 ENCOUNTER — OFFICE VISIT (OUTPATIENT)
Age: 39
End: 2024-12-10
Payer: COMMERCIAL

## 2024-12-10 VITALS
WEIGHT: 153.38 LBS | BODY MASS INDEX: 28 KG/M2 | HEART RATE: 72 BPM | SYSTOLIC BLOOD PRESSURE: 138 MMHG | DIASTOLIC BLOOD PRESSURE: 87 MMHG | RESPIRATION RATE: 16 BRPM | OXYGEN SATURATION: 98 % | TEMPERATURE: 99 F

## 2024-12-10 DIAGNOSIS — N64.4 MASTALGIA: Primary | ICD-10-CM

## 2024-12-10 PROCEDURE — 99204 OFFICE O/P NEW MOD 45 MIN: CPT | Performed by: SURGERY

## 2024-12-11 NOTE — CONSULTS
[facility]    Patient Name:  Belinda Laura   YOB: 1985   Gender:  Female   Appt Date:  12/10/2024   Provider:  Raymon Arreaga MD   Insurance:  [facility]     PATIENT PROVIDERS  Referring Provider: No ref. provider found   Address: No referring provider defined for this encounter.   Phone #: N/A    Primary Care Provider:Alfonso Cedeno MD   Address: [address]  [place] [address]   Phone #: [phone]       CHIEF COMPLAINT  Chief Complaint   Patient presents with    Consult        PROBLEMS  Reviewed There is no problem list on file for this patient.       History of Present Illness:  I am seeing Ms.Reyna Esvin Laura today at the request of Dr. Cedeno to render opinion guarding management of bilateral breast pain.  Very pleasant otherwise healthy 39-year-old female.  She started experiencing breast pain about 4 to 6 weeks ago.  Pain is bilateral.  No associated symptoms, specifically no discharge or palpable masses.  She did undergo bilateral diagnostic mammograms on 10/17/2024 [[facility]] this returned to show a complex cyst measuring 5 x 4 x 5 mm at the 3 o'clock position of the left breast and a retroareolar complex cyst in the right breast measuring 5 mm as well.  There was no evidence of other abnormalities.  Recommendation was to repeat imaging in 6 months.     Vital Signs:  /87 (BP Location: Right arm, Patient Position: Sitting, Cuff Size: adult)   Pulse 72   Temp 98.6 °F (37 °C) (Oral)   Resp 16   Wt 69.6 kg (153 lb 6.4 oz)   LMP 06/03/2024 (Exact Date)   SpO2 98%   BMI 28.06 kg/m²      Medications Reviewed:    Current Outpatient Medications:     naproxen 500 MG Oral Tab, Take 1 tablet (500 mg total) by mouth 2 (two) times daily as needed., Disp: 60 tablet, Rfl: 1    Multiple Vitamin (MULTI-VITAMIN) Oral Tab, Take 1 tablet by mouth daily., Disp: , Rfl:      Allergies Reviewed:  Allergies[1]      History:  Reviewed:  No past medical history on file.   Reviewed:  No past surgical history on file.   Reviewed Social History:  Social History     Socioeconomic History    Marital status: Single   Tobacco Use    Smoking status: Never    Smokeless tobacco: Never   Substance and Sexual Activity    Alcohol use: Never    Drug use: Never      Reviewed:  Family History   Problem Relation Age of Onset    No Known Problems Mother     Other (Other) Father         alcoholisim    Diabetes Sister     Cancer Sister         bone    No Known Problems Brother     Breast Cancer Paternal Aunt         Review of Systems:  Review of Systems   Constitutional:  Negative for activity change, appetite change, chills, fatigue, fever and unexpected weight change.   HENT:  Negative for congestion and trouble swallowing.    Eyes:  Negative for discharge and redness.   Respiratory:  Negative for cough, chest tightness and shortness of breath.    Cardiovascular:  Negative for chest pain and leg swelling.   Gastrointestinal:  Negative for abdominal distention, abdominal pain and nausea.   Endocrine: Negative for polydipsia and polyuria.   Genitourinary:  Negative for difficulty urinating and dysuria.   Musculoskeletal:  Negative for myalgias.   Skin:  Negative for color change and pallor.   Allergic/Immunologic: Negative for immunocompromised state.   Neurological:  Negative for syncope and weakness.   Hematological:  Does not bruise/bleed easily.   Psychiatric/Behavioral:  Negative for agitation and confusion.         Physical Examination:  Physical Exam  Constitutional:       Appearance: She is well-developed.   HENT:      Head: Normocephalic.   Eyes:      Pupils: Pupils are equal, round, and reactive to light.   Cardiovascular:      Rate and Rhythm: Normal rate and regular rhythm.      Heart sounds: No murmur heard.  Pulmonary:      Effort: Pulmonary effort is normal. No respiratory distress.      Breath sounds: Normal breath sounds. No  wheezing or rales.   Chest:   Breasts:     Right: No swelling, bleeding, inverted nipple or mass.      Left: No swelling, bleeding, inverted nipple or mass.   Abdominal:      General: There is no distension.      Palpations: Abdomen is soft.      Tenderness: There is no abdominal tenderness.   Musculoskeletal:      Cervical back: Normal range of motion.   Skin:     General: Skin is warm and dry.   Neurological:      Mental Status: She is alert and oriented to person, place, and time.          Assessment / Plan:  Mastalgia, noncyclical constant.  Reviewed imaging, unlikely that breast cysts are responsible for her pain.  Agree with repeating ultrasound in 6 months  Patient reassured  Recommend well-fitting bra/sports bra to instructions given on duration and weaning off  Trial of NSAIDs, Tylenol  In 6 weeks if no improvement call to follow-up with me and we can discuss additional treatment options    Raymon Arreaga MD  [facility] Oncology  [facility]  [email or web address]       Follow Up:  No follow-ups on file.       Electronically Signed by: Raymon Arreaga MD         [1] No Known Allergies

## 2024-12-12 ENCOUNTER — DOCUMENTATION ONLY (OUTPATIENT)
Dept: SURGERY | Facility: CLINIC | Age: 39
End: 2024-12-12

## 2024-12-12 NOTE — PROGRESS NOTES
Breast Clinic: Initial Patient Visit Form    Questionnaire completed with assistance of MA and  services. Sending for scanning.

## 2025-03-31 ENCOUNTER — TELEPHONE (OUTPATIENT)
Age: 40
End: 2025-03-31

## 2025-03-31 DIAGNOSIS — Z12.31 ENCOUNTER FOR SCREENING MAMMOGRAM FOR MALIGNANT NEOPLASM OF BREAST: Primary | ICD-10-CM

## 2025-03-31 DIAGNOSIS — R92.8 ABNORMAL MAMMOGRAM: ICD-10-CM

## 2025-03-31 NOTE — TELEPHONE ENCOUNTER
Pt is calling stating that she is due  for her 6 month f/u mammogram. Pt mention that last mammogram was done on 10/2024.       If order could be sent to [facility] in [place] fax. [phone]

## 2025-04-01 NOTE — TELEPHONE ENCOUNTER
Spoke with  Aj #  [id number] feliberto Trevino informed mammogram has been entered in the system, so she can schedule an appointment. Patient voiced understanding.

## 2025-04-01 NOTE — TELEPHONE ENCOUNTER
10/10/24 Last bilateral diagnostic mammogram  and ultrasound recommends imaging in 6 months ( results under Media).       Diagnostic bilateral mammogram with US included pended; authorize if appropriate.

## 2025-04-02 NOTE — TELEPHONE ENCOUNTER
Mammogram order was faxed to [facility] as patient requested on previous msgs. Conformation was received.

## 2025-05-07 ENCOUNTER — TELEPHONE (OUTPATIENT)
Age: 40
End: 2025-05-07

## 2025-05-07 DIAGNOSIS — R73.03 PREDIABETES: ICD-10-CM

## 2025-05-07 DIAGNOSIS — Z00.00 PREVENTATIVE HEALTH CARE: ICD-10-CM

## 2025-05-07 DIAGNOSIS — E78.5 HYPERLIPIDEMIA, UNSPECIFIED HYPERLIPIDEMIA TYPE: Primary | ICD-10-CM

## 2025-05-07 NOTE — TELEPHONE ENCOUNTER
Pt called requesting bw orders to be placed and fax over to [facility] at [phone]  Pt has an upcoming appt on 05/12 and is planning to get this labs done on Friday    Please advise

## 2025-05-10 LAB
ALBUMIN/GLOBULIN RATIO: 1.6 (CALC) (ref 1–2.5)
ALBUMIN: 4.4 G/DL (ref 3.6–5.1)
ALKALINE PHOSPHATASE: 69 U/L (ref 31–125)
ALT: 12 U/L (ref 6–29)
AST: 15 U/L (ref 10–30)
BILIRUBIN, TOTAL: 0.5 MG/DL (ref 0.2–1.2)
BUN/CREATININE RATIO: 23 (CALC) (ref 6–22)
BUN: 10 MG/DL (ref 7–25)
CALCIUM: 9.1 MG/DL (ref 8.6–10.2)
CARBON DIOXIDE: 24 MMOL/L (ref 20–32)
CHLORIDE: 106 MMOL/L (ref 98–110)
CHOL/HDLC RATIO: 4.2 (CALC)
CHOLESTEROL, TOTAL: 238 MG/DL
CREATININE: 0.44 MG/DL (ref 0.5–0.99)
EGFR: 125 ML/MIN/1.73M2
GLOBULIN: 2.7 G/DL (CALC) (ref 1.9–3.7)
GLUCOSE: 94 MG/DL (ref 65–99)
HDL CHOLESTEROL: 57 MG/DL
HEMATOCRIT: 41.1 % (ref 35–45)
HEMOGLOBIN A1C: 5.9 %
HEMOGLOBIN: 12.9 G/DL (ref 11.7–15.5)
LDL-CHOLESTEROL: 153 MG/DL (CALC)
MCH: 29.4 PG (ref 27–33)
MCHC: 31.4 G/DL (ref 32–36)
MCV: 93.6 FL (ref 80–100)
MPV: 11.2 FL (ref 7.5–12.5)
NON-HDL CHOLESTEROL: 181 MG/DL (CALC)
PLATELET COUNT: 246 THOUSAND/UL (ref 140–400)
POTASSIUM: 3.9 MMOL/L (ref 3.5–5.3)
PROTEIN, TOTAL: 7.1 G/DL (ref 6.1–8.1)
RDW: 12.7 % (ref 11–15)
RED BLOOD CELL COUNT: 4.39 MILLION/UL (ref 3.8–5.1)
SODIUM: 138 MMOL/L (ref 135–146)
TRIGLYCERIDES: 150 MG/DL
WHITE BLOOD CELL COUNT: 5.7 THOUSAND/UL (ref 3.8–10.8)

## 2025-05-12 ENCOUNTER — OFFICE VISIT (OUTPATIENT)
Age: 40
End: 2025-05-12
Payer: COMMERCIAL

## 2025-05-12 VITALS
WEIGHT: 148 LBS | HEART RATE: 85 BPM | BODY MASS INDEX: 27.23 KG/M2 | OXYGEN SATURATION: 98 % | SYSTOLIC BLOOD PRESSURE: 122 MMHG | HEIGHT: 62 IN | TEMPERATURE: 97 F | DIASTOLIC BLOOD PRESSURE: 80 MMHG

## 2025-05-12 DIAGNOSIS — Z12.4 SCREENING FOR CERVICAL CANCER: Primary | ICD-10-CM

## 2025-05-12 PROCEDURE — 88175 CYTOPATH C/V AUTO FLUID REDO: CPT | Performed by: INTERNAL MEDICINE

## 2025-05-12 PROCEDURE — 87625 HPV TYPES 16 & 18 ONLY: CPT | Performed by: INTERNAL MEDICINE

## 2025-05-12 PROCEDURE — 87624 HPV HI-RISK TYP POOLED RSLT: CPT | Performed by: INTERNAL MEDICINE

## 2025-05-15 LAB
HPV E6+E7 MRNA CVX QL NAA+PROBE: POSITIVE
HPV16 DNA CVX QL PROBE+SIG AMP: NEGATIVE
HPV18 DNA CVX QL PROBE+SIG AMP: NEGATIVE

## 2025-05-20 ENCOUNTER — TELEPHONE (OUTPATIENT)
Age: 40
End: 2025-05-20

## 2025-05-20 NOTE — PROGRESS NOTES
Please inform that her PAP smear returned negative for any intraepithelial lesion or malignancy.   Positive for HPV, however, high risk 16 & 18 negative.  Repeat PAP recommended in one year      Dr. Cedeno

## 2025-05-20 NOTE — TELEPHONE ENCOUNTER
----- Message from Alfonso Cedeno sent at 5/20/2025 12:15 PM CDT -----  Please inform that her PAP smear returned negative for any intraepithelial lesion or malignancy.   Positive for HPV, however, high risk 16 & 18 negative.  Repeat PAP recommended in one year      Dr. Cedeno  ----- Message -----  From: Lab, Background User  Sent: 5/15/2025   7:06 AM CDT  To: Alfonso Cedeno MD

## 2025-05-20 NOTE — TELEPHONE ENCOUNTER
Results were given  was verified, patient embolized understanding no further questions at this time.

## 2025-05-21 ENCOUNTER — TELEPHONE (OUTPATIENT)
Age: 40
End: 2025-05-21

## 2025-05-21 DIAGNOSIS — N60.19 MULTIPLE CYSTS OF BREAST: Primary | ICD-10-CM

## 2025-05-22 NOTE — TELEPHONE ENCOUNTER
Results were discuss with patient  was verified, orders were mailed out to her home address (pt confirmed address) no questions at this moment.

## 2025-05-22 NOTE — TELEPHONE ENCOUNTER
Imaging from [facility] imaging reviewed.  She had a bilateral diagnostic mammogram on 5/21/2025.  No suspicious masses microcalcifications or areas of architectural distortion are seen in either breast.  A biopsy clip is present in the left breast.  Under impression probable benign complex cyst in both breast.  It is recommended to follow-up with a bilateral breast ultrasound in 6 months to confirm stability.    I recommend a repeat bilateral diagnostic mammogram and ultrasound in 6 months to complete stability.  Orders placed.    Dr. Cedeno

## 2025-05-27 NOTE — PROGRESS NOTES
[facility] part of [facility]  Return Patient Progress Note      HPI:     Chief Complaint   Patient presents with    Follow - Up     6 months f/u       Belinda Laura is a 39 year old female presenting for:    Has a significant  has no past medical history on file.    Here for follow up.      Requesting PAP smear.    Labs:   CMP:  Lab Results   Component Value Date/Time     05/09/2025 11:24 AM    K 3.9 05/09/2025 11:24 AM     05/09/2025 11:24 AM    CO2 24 05/09/2025 11:24 AM    CREATSERUM 0.44 (L) 05/09/2025 11:24 AM    CA 9.1 05/09/2025 11:24 AM    GLU 94 05/09/2025 11:24 AM    TP 7.1 05/09/2025 11:24 AM    ALB 4.4 05/09/2025 11:24 AM    ALKPHO 69 05/09/2025 11:24 AM    AST 15 05/09/2025 11:24 AM    ALT 12 05/09/2025 11:24 AM    BILT 0.5 05/09/2025 11:24 AM        CBC:  Lab Results   Component Value Date    WBC 5.7 05/09/2025    HGB 12.9 05/09/2025    HCT 41.1 05/09/2025     05/09/2025    NEPERCENT 52.9 08/05/2024    LYPERCENT 37.4 08/05/2024    MOPERCENT 6.9 08/05/2024    EOPERCENT 2.5 08/05/2024    BAPERCENT 0.3 08/05/2024    NE 3,121 08/05/2024    LYMABS 2,207 08/05/2024    MOABSO 407 08/05/2024    EOABSO 148 08/05/2024    BAABSO 18 08/05/2024          Hemoglobin A1C, Microalbumin  Lab Results   Component Value Date/Time    A1C 5.9 (H) 05/09/2025 11:24 AM        Lipid panel  Lab Results   Component Value Date/Time    CHOLEST 238 (H) 05/09/2025 11:24 AM    HDL 57 05/09/2025 11:24 AM    TRIG 150 (H) 05/09/2025 11:24 AM     (H) 05/09/2025 11:24 AM    NONHDLC 181 (H) 05/09/2025 11:24 AM        Medications:  Current Medications[1]   PMH:  Past Medical History[2]            REVIEW OF SYSTEMS:   Review of Systems   Constitutional:  Negative for chills, fatigue, fever and unexpected weight change.   HENT:  Negative for congestion, ear pain, hearing loss, rhinorrhea, sinus pain and sore throat.    Eyes:  Negative for pain, redness and visual disturbance.   Respiratory:   Negative for apnea, cough, chest tightness, shortness of breath and wheezing.    Cardiovascular:  Negative for chest pain, palpitations and leg swelling.   Gastrointestinal:  Negative for abdominal distention, abdominal pain, blood in stool, constipation and nausea.   Endocrine: Negative for cold intolerance, heat intolerance and polyuria.   Genitourinary:  Negative for dysuria, hematuria and urgency.   Musculoskeletal:  Negative for arthralgias, back pain, gait problem, joint swelling, myalgias and neck pain.   Skin:  Negative for rash and wound.   Allergic/Immunologic: Negative for food allergies and immunocompromised state.   Neurological:  Negative for dizziness, seizures, facial asymmetry, speech difficulty, weakness, light-headedness, numbness and headaches.   Hematological:  Negative for adenopathy. Does not bruise/bleed easily.   Psychiatric/Behavioral:  Negative for behavioral problems, sleep disturbance and suicidal ideas. The patient is not nervous/anxious.             PHYSICAL EXAM:   /80   Pulse 85   Temp 97.1 °F (36.2 °C)   Ht 5' 2\" (1.575 m)   Wt 148 lb (67.1 kg)   LMP 06/03/2024 (Exact Date)   SpO2 98%   BMI 27.07 kg/m²  Estimated body mass index is 27.07 kg/m² as calculated from the following:    Height as of this encounter: 5' 2\" (1.575 m).    Weight as of this encounter: 148 lb (67.1 kg).     Wt Readings from Last 3 Encounters:   05/12/25 148 lb (67.1 kg)   12/10/24 153 lb 6.4 oz (69.6 kg)   11/12/24 152 lb (68.9 kg)       Physical Exam  Vitals reviewed.   Constitutional:       General: She is not in acute distress.     Appearance: She is well-developed.   HENT:      Head: Normocephalic and atraumatic.   Eyes:      Conjunctiva/sclera: Conjunctivae normal.      Pupils: Pupils are equal, round, and reactive to light.   Neck:      Thyroid: No thyromegaly.   Cardiovascular:      Rate and Rhythm: Normal rate and regular rhythm.      Heart sounds: Normal heart sounds, S1 normal and S2  normal. No murmur heard.     No friction rub. No gallop.   Pulmonary:      Effort: Pulmonary effort is normal. No respiratory distress.      Breath sounds: Normal breath sounds. No wheezing or rales.   Chest:      Chest wall: No tenderness.   Abdominal:      General: Bowel sounds are normal. There is no distension.      Palpations: Abdomen is soft. There is no mass.      Tenderness: There is no abdominal tenderness. There is no guarding or rebound.   Genitourinary:     Comments: PAP completed with MA chaperone in room.      Musculoskeletal:         General: No tenderness. Normal range of motion.      Cervical back: Normal range of motion.   Lymphadenopathy:      Cervical: No cervical adenopathy.   Skin:     General: Skin is warm.      Findings: No erythema or rash.   Neurological:      Mental Status: She is alert and oriented to person, place, and time.      Cranial Nerves: No cranial nerve deficit.      Deep Tendon Reflexes: Reflexes are normal and symmetric.   Psychiatric:         Behavior: Behavior normal.         Thought Content: Thought content normal.         Judgment: Judgment normal.               ASSESSMENT AND PLAN:   Patient is a 39 year old female who presents primarily presents for:    (Z12.4) Screening for cervical cancer  (primary encounter diagnosis)  Plan: ThinPrep PAP Smear, Hpv Dna  High Risk , Thin         Prep Collect, ThinPrep PAP Smear, Hpv Dna  High        Risk , Thin Prep Collect, THINPREP PAP SMEAR         ONLY, HPV 16,18/45 Genotype            Health Maintenance:    Health Maintenance   Topic Date Due    Mammogram  Never done    DTaP,Tdap,and Td Vaccines (1 - Tdap) Never done    COVID-19 Vaccine (4 - 2024-25 season) 09/01/2024    Annual Depression Screening  01/01/2025    Annual Physical  11/12/2025    Pap Smear  05/12/2026    Influenza Vaccine  Completed    Pneumococcal Vaccine: Birth to 50yrs  Aged Out    Meningococcal B Vaccine  Aged Out         Meds & Refills for this Visit:  Requested  Prescriptions      No prescriptions requested or ordered in this encounter       Orders Placed This Encounter   Procedures    Hpv Dna  High Risk , Thin Prep Collect    HPV 16,18/45 Genotype    ThinPrep PAP Smear    THINPREP PAP SMEAR ONLY       Imaging & Consults:  None          No follow-ups on file.  Important follow up notes/labs for next visit      Patient indicates understanding of the above recommendations and agrees to the above plan.  Reasurrance and education provided. All questions answered.    Notified to call with any questions, complications, allergies, or worsening or changing symptoms as well as any side effects or complications from the treatments .  Red flags/ ER precautions discussed.    If diagnostic labs or imaging ordered advised patient to contact my office for results  24-48 hours after completion    This note was dictated using dragon speech recognition transcription software.  Typographical and transcription errors may be present.  Please call if any questions.       As part of our commitment to providing you with comprehensive, transparent, and timely access to your health information, we adhere to the guidelines set forth by the 21st Century Cures Act. This Act enhances your rights to access your electronic health information and ensures that you can easily obtain your medical records.  Please note that the verbage used in this note is intended for medical documentation and communication and may be interpreted as forthright.  Please do not hesitate to contact my office if you have any questions.            Alfonso Cedeno MD  [facility]                         [1]   Current Outpatient Medications   Medication Sig Dispense Refill    naproxen 500 MG Oral Tab Take 1 tablet (500 mg total) by mouth 2 (two) times daily as needed. 60 tablet 1    Multiple Vitamin (MULTI-VITAMIN) Oral Tab Take 1 tablet by mouth daily.     [2] History reviewed. No pertinent past medical history.